# Patient Record
Sex: FEMALE | Race: WHITE | NOT HISPANIC OR LATINO | Employment: UNEMPLOYED | URBAN - NONMETROPOLITAN AREA
[De-identification: names, ages, dates, MRNs, and addresses within clinical notes are randomized per-mention and may not be internally consistent; named-entity substitution may affect disease eponyms.]

---

## 2017-01-10 ENCOUNTER — TELEPHONE (OUTPATIENT)
Dept: INTERNAL MEDICINE | Facility: CLINIC | Age: 27
End: 2017-01-10

## 2017-01-10 NOTE — TELEPHONE ENCOUNTER
----- Message from Lety Weir sent at 1/9/2017  3:17 PM EST -----  Contact: Patient   Patient called the office requesting to see if Dr. Pedro could send in a Rx for her restless leg syndrome. She said that the Ropentanol no longer helps her and is wanting to see if something else could get sent in to the [facility] pharmacy.

## 2017-02-06 ENCOUNTER — TELEPHONE (OUTPATIENT)
Dept: INTERNAL MEDICINE | Facility: CLINIC | Age: 27
End: 2017-02-06

## 2017-02-06 NOTE — TELEPHONE ENCOUNTER
Spoke with pt, she will bring letter by from Bariatric surgeon Wed am that just needs signature from Dr. Pedro.

## 2017-02-06 NOTE — TELEPHONE ENCOUNTER
----- Message from Carmelita Hidalgo sent at 2/6/2017 11:32 AM EST -----  Contact: PATIENT   PLEASE RETURN CALL. PATIENT HAS A PAPER THAT SHE JUST NEEDS DR. SALVADOR TO FILL OUT FOR HER WEIGHT LOSS. WOULD LIKE TO KNOW IF SHE CAN JUST DROP THIS OFF, OR FAX IT TO OUR OFFICE INSTEAD OF COMING IN FOR AN APPOINTMENT?

## 2017-02-17 ENCOUNTER — PATIENT MESSAGE (OUTPATIENT)
Dept: INTERNAL MEDICINE | Facility: CLINIC | Age: 27
End: 2017-02-17

## 2017-02-17 RX ORDER — OSELTAMIVIR PHOSPHATE 75 MG/1
75 CAPSULE ORAL DAILY
Qty: 10 CAPSULE | Refills: 0 | Status: SHIPPED | OUTPATIENT
Start: 2017-02-17 | End: 2017-02-27

## 2017-02-17 NOTE — TELEPHONE ENCOUNTER
From: Carolyn Schrader  To: Kristen Pedro MD  Sent: 2/17/2017 9:38 AM EST  Subject: Prescription Question    I have been around 2 kids with the flu type A in the last couple of days and was wondering if I could get a prescription for tamiflu as precaution. I have had a couple symptoms such as upset stomach headache and fatigue. With my brother in law having cancer and on chemo I would like to try to prevent getting the flu virus.     I use [email or web address] in [place].     Thank you.

## 2017-03-27 DIAGNOSIS — J45.30 MILD PERSISTENT ASTHMA WITHOUT COMPLICATION: ICD-10-CM

## 2017-03-27 RX ORDER — MONTELUKAST SODIUM 10 MG/1
TABLET ORAL
Qty: 30 TABLET | Refills: 5 | Status: SHIPPED | OUTPATIENT
Start: 2017-03-27 | End: 2017-08-02 | Stop reason: SDUPTHER

## 2017-03-28 RX ORDER — LANSOPRAZOLE 15 MG/1
15 CAPSULE, DELAYED RELEASE ORAL DAILY
Qty: 30 CAPSULE | Refills: 3 | Status: SHIPPED | OUTPATIENT
Start: 2017-03-28 | End: 2017-04-07

## 2017-03-28 RX ORDER — METOPROLOL SUCCINATE 50 MG/1
50 TABLET, EXTENDED RELEASE ORAL DAILY
Qty: 30 TABLET | Refills: 3 | Status: SHIPPED | OUTPATIENT
Start: 2017-03-28 | End: 2017-08-02

## 2017-04-07 RX ORDER — OMEPRAZOLE 20 MG/1
20 CAPSULE, DELAYED RELEASE ORAL DAILY
Qty: 30 CAPSULE | Refills: 5 | Status: SHIPPED | OUTPATIENT
Start: 2017-04-07 | End: 2017-08-02

## 2017-04-17 RX ORDER — LANSOPRAZOLE 15 MG/1
15 CAPSULE, DELAYED RELEASE ORAL DAILY
Qty: 30 CAPSULE | Refills: 3 | Status: SHIPPED | OUTPATIENT
Start: 2017-04-17 | End: 2017-08-02 | Stop reason: SDUPTHER

## 2017-06-14 DIAGNOSIS — F33.0 MILD EPISODE OF RECURRENT MAJOR DEPRESSIVE DISORDER (HCC): ICD-10-CM

## 2017-06-14 RX ORDER — CITALOPRAM 20 MG/1
20 TABLET ORAL DAILY
Qty: 30 TABLET | Refills: 0 | Status: SHIPPED | OUTPATIENT
Start: 2017-06-14 | End: 2017-07-26 | Stop reason: SDUPTHER

## 2017-07-26 DIAGNOSIS — F33.0 MILD EPISODE OF RECURRENT MAJOR DEPRESSIVE DISORDER (HCC): ICD-10-CM

## 2017-07-26 RX ORDER — CITALOPRAM 20 MG/1
TABLET ORAL
Qty: 14 TABLET | Refills: 0 | Status: SHIPPED | OUTPATIENT
Start: 2017-07-26 | End: 2017-09-01

## 2017-08-02 ENCOUNTER — OFFICE VISIT (OUTPATIENT)
Dept: INTERNAL MEDICINE | Facility: CLINIC | Age: 27
End: 2017-08-02

## 2017-08-02 VITALS
OXYGEN SATURATION: 96 % | RESPIRATION RATE: 16 BRPM | HEIGHT: 63 IN | TEMPERATURE: 98.7 F | BODY MASS INDEX: 45.2 KG/M2 | WEIGHT: 255.13 LBS | HEART RATE: 82 BPM | DIASTOLIC BLOOD PRESSURE: 92 MMHG | SYSTOLIC BLOOD PRESSURE: 130 MMHG

## 2017-08-02 DIAGNOSIS — I10 ESSENTIAL HYPERTENSION: ICD-10-CM

## 2017-08-02 DIAGNOSIS — E66.01 MORBID OBESITY, UNSPECIFIED OBESITY TYPE (HCC): ICD-10-CM

## 2017-08-02 DIAGNOSIS — F33.0 MILD EPISODE OF RECURRENT MAJOR DEPRESSIVE DISORDER (HCC): Primary | ICD-10-CM

## 2017-08-02 DIAGNOSIS — K21.9 GASTROESOPHAGEAL REFLUX DISEASE WITHOUT ESOPHAGITIS: ICD-10-CM

## 2017-08-02 DIAGNOSIS — J45.30 MILD PERSISTENT ASTHMA WITHOUT COMPLICATION: ICD-10-CM

## 2017-08-02 PROCEDURE — 99214 OFFICE O/P EST MOD 30 MIN: CPT | Performed by: NURSE PRACTITIONER

## 2017-08-02 RX ORDER — LANSOPRAZOLE 30 MG/1
30 CAPSULE, DELAYED RELEASE ORAL DAILY
Qty: 30 CAPSULE | Refills: 5 | Status: SHIPPED | OUTPATIENT
Start: 2017-08-02 | End: 2018-06-14 | Stop reason: SDUPTHER

## 2017-08-02 RX ORDER — LORATADINE 10 MG/1
10 TABLET ORAL DAILY
Qty: 30 TABLET | Refills: 5 | Status: SHIPPED | OUTPATIENT
Start: 2017-08-02 | End: 2018-06-14 | Stop reason: SDUPTHER

## 2017-08-02 RX ORDER — VENLAFAXINE 50 MG/1
50 TABLET ORAL DAILY
Qty: 30 TABLET | Refills: 0 | Status: SHIPPED | OUTPATIENT
Start: 2017-08-02 | End: 2017-09-01 | Stop reason: SINTOL

## 2017-08-02 RX ORDER — ALBUTEROL SULFATE 90 UG/1
2 AEROSOL, METERED RESPIRATORY (INHALATION) EVERY 6 HOURS PRN
Qty: 1 INHALER | Refills: 3 | OUTPATIENT
Start: 2017-08-02 | End: 2021-07-05

## 2017-08-02 RX ORDER — FLUTICASONE PROPIONATE 50 MCG
1 SPRAY, SUSPENSION (ML) NASAL DAILY PRN
Qty: 1 EACH | Refills: 2 | Status: SHIPPED | OUTPATIENT
Start: 2017-08-02 | End: 2021-07-28

## 2017-08-02 RX ORDER — MONTELUKAST SODIUM 10 MG/1
10 TABLET ORAL NIGHTLY
Qty: 30 TABLET | Refills: 5 | Status: SHIPPED | OUTPATIENT
Start: 2017-08-02 | End: 2018-06-14 | Stop reason: SDUPTHER

## 2017-08-02 NOTE — PATIENT INSTRUCTIONS
Venlafaxine tablets  What is this medicine?  VENLAFAXINE (CINTHIA la fax een) is used to treat depression, anxiety and panic disorder.  This medicine may be used for other purposes; ask your health care provider or pharmacist if you have questions.  COMMON BRAND NAME(S): Effexor  What should I tell my health care provider before I take this medicine?  They need to know if you have any of these conditions:  -bleeding disorders  -glaucoma  -heart disease  -high blood pressure  -high cholesterol  -kidney disease  -liver disease  -low levels of sodium in the blood  -mitch or bipolar disorder  -seizures  -suicidal thoughts, plans, or attempt; a previous suicide attempt by you or a family  -take medicines that treat or prevent blood clots  -thyroid disease  -an unusual or allergic reaction to venlafaxine, desvenlafaxine, other medicines, foods, dyes, or preservatives  -pregnant or trying to get pregnant  -breast-feeding  How should I use this medicine?  Take this medicine by mouth with a glass of water. Follow the directions on the prescription label. Take it with food. Take your medicine at regular intervals. Do not take your medicine more often than directed. Do not stop taking this medicine suddenly except upon the advice of your doctor. Stopping this medicine too quickly may cause serious side effects or your condition may worsen.  A special MedGuide will be given to you by the pharmacist with each prescription and refill. Be sure to read this information carefully each time.  Talk to your pediatrician regarding the use of this medicine in children. Special care may be needed.  Overdosage: If you think you have taken too much of this medicine contact a poison control center or emergency room at once.  NOTE: This medicine is only for you. Do not share this medicine with others.  What if I miss a dose?  If you miss a dose, take it as soon as you can. If it is almost time for your next dose, take only that dose. Do not take  double or extra doses.  What may interact with this medicine?  Do not take this medicine with any of the following medications:  -certain medicines for fungal infections like fluconazole, itraconazole, ketoconazole, posaconazole, voriconazole  -cisapride  -desvenlafaxine  -dofetilide  -dronedarone  -duloxetine  -levomilnacipran  -linezolid  -MAOIs like Carbex, Eldepryl, Marplan, Nardil, and Parnate  -methylene blue (injected into a vein)  -milnacipran  -pimozide  -thioridazine  -ziprasidone  This medicine may also interact with the following medications:  -amphetamines  -aspirin and aspirin-like medicines  -certain medicines for depression, anxiety, or psychotic disturbances  -certain medicines for migraine headaches like almotriptan, eletriptan, frovatriptan, naratriptan, rizatriptan, sumatriptan, zolmitriptan  -certain medicines for sleep  -certain medicines that treat or prevent blood clots like dalteparin, enoxaparin, warfarin  -cimetidine  -clozapine  -diuretics  -fentanyl  -furazolidone  -indinavir  -isoniazid  -lithium  -metoprolol  -NSAIDS, medicines for pain and inflammation, like ibuprofen or naproxen  -other medicines that prolong the QT interval (cause an abnormal heart rhythm)  -procarbazine  -rasagiline  -supplements like Sravan's wort, kava kava, valerian  -tramadol  -tryptophan  This list may not describe all possible interactions. Give your health care provider a list of all the medicines, herbs, non-prescription drugs, or dietary supplements you use. Also tell them if you smoke, drink alcohol, or use illegal drugs. Some items may interact with your medicine.  What should I watch for while using this medicine?  Tell your doctor if your symptoms do not get better or if they get worse. Visit your doctor or health care professional for regular checks on your progress. Because it may take several weeks to see the full effects of this medicine, it is important to continue your treatment as prescribed  by your doctor.  Patients and their families should watch out for new or worsening thoughts of suicide or depression. Also watch out for sudden changes in feelings such as feeling anxious, agitated, panicky, irritable, hostile, aggressive, impulsive, severely restless, overly excited and hyperactive, or not being able to sleep. If this happens, especially at the beginning of treatment or after a change in dose, call your health care professional.  This medicine can cause an increase in blood pressure. Check with your doctor for instructions on monitoring your blood pressure while taking this medicine.  You may get drowsy or dizzy. Do not drive, use machinery, or do anything that needs mental alertness until you know how this medicine affects you. Do not stand or sit up quickly, especially if you are an older patient. This reduces the risk of dizzy or fainting spells. Alcohol may interfere with the effect of this medicine. Avoid alcoholic drinks.  Your mouth may get dry. Chewing sugarless gum, sucking hard candy and drinking plenty of water will help. Contact your doctor if the problem does not go away or is severe.  What side effects may I notice from receiving this medicine?  Side effects that you should report to your doctor or health care professional as soon as possible:  -allergic reactions like skin rash, itching or hives, swelling of the face, lips, or tongue  -breathing problems  -changes in vision  -seizures  -suicidal thoughts or other mood changes  -trouble passing urine or change in the amount of urine  -unusual bleeding or bruising  Side effects that usually do not require medical attention (report to your doctor or health care professional if they continue or are bothersome):  -change in sex drive or performance  -constipation  -increased sweating  -loss of appetite  -nausea  -tremors  -weight loss  This list may not describe all possible side effects. Call your doctor for medical advice about side  effects. You may report side effects to FDA at 2-154-GWD-6764.  Where should I keep my medicine?  Keep out of the reach of children.  Store at a controlled temperature between 20 and 25 degrees C (68 and 77 degrees F), in a dry place. Throw away any unused medicine after the expiration date.  NOTE: This sheet is a summary. It may not cover all possible information. If you have questions about this medicine, talk to your doctor, pharmacist, or health care provider.     © 2017, [employer]. (2016-10-25 21:22:16)

## 2017-08-02 NOTE — PROGRESS NOTES
Chief Complaint / Reason:      Chief Complaint   Patient presents with   • Depression     f/u, asked to change Celexa to something else and increase the Prevacid to 30 mg.        Subjective     HPI  Patient presents today with complaints of depression. She would like to discussed medications for depression and GERD. She states that she is very irritable and is not feeling like herself. States she has battled with depression for a long time and thinks its gotten worse she has tried different medications and is currently on Celexa but does not feel it is that effective. Denies intent to harm herself or others. She had gastric sleeve in march and is trying to improve overall health. She states that she doesn't feel like she is under more stress than normal but could be as her reflux is worse, indicates the medication needs increased.   History taken from: patient    PMH/FH/Social History were reviewed and updated appropriately in the electronic medical record.     Review of Systems:   Review of Systems   Constitutional: Positive for fatigue.   Respiratory: Negative.    Cardiovascular: Positive for leg swelling. Negative for chest pain and palpitations.   Gastrointestinal: Negative.    Musculoskeletal: Positive for arthralgias.   Psychiatric/Behavioral: Negative for self-injury and suicidal ideas.        Depressed mood and irritable     All other systems were reviewed and are negative.  Exceptions are noted in the subjective or above.      Objective     Vital Signs  Vitals:    08/02/17 0943   BP: 130/92   Pulse: 82   Resp: 16   Temp: 98.7 °F (37.1 °C)   SpO2: 96%       Body mass index is 45.19 kg/(m^2).    Physical Exam   Constitutional: She is oriented to person, place, and time. She appears well-developed and well-nourished.   Morbidly obese   HENT:   Head: Normocephalic.   Eyes: Pupils are equal, round, and reactive to light.   Cardiovascular: Normal rate, regular rhythm, normal heart sounds and intact distal  pulses.    Pulmonary/Chest: Effort normal and breath sounds normal.   Abdominal: Soft. Bowel sounds are normal.   Musculoskeletal: She exhibits edema.   Neurological: She is alert and oriented to person, place, and time.   Skin: Skin is warm and dry. No rash noted. No erythema.   Psychiatric: She has a normal mood and affect. Her behavior is normal. Judgment and thought content normal.   Nursing note and vitals reviewed.    Medication Review:     Current Outpatient Prescriptions:   •  albuterol (PROVENTIL) (2.5 MG/3ML) 0.083% nebulizer solution, Take 2.5 mg by nebulization Every 6 (Six) Hours As Needed for wheezing. USE 1 UNIT DOSE EVERY 4-6 HOURS AS NEEDED FOR WHEEZING, Disp: 150 vial, Rfl: 3  •  citalopram (CeleXA) 20 MG tablet, TAKE ONE TABLET BY MOUTH ONCE DAILY NEED  APPOINTMENT  FOR  ADDITIONAL  REFILLS, Disp: 14 tablet, Rfl: 0  •  albuterol (PROVENTIL HFA;VENTOLIN HFA) 108 (90 BASE) MCG/ACT inhaler, Inhale 2 puffs Every 6 (Six) Hours As Needed for Wheezing., Disp: 1 inhaler, Rfl: 3  •  fluticasone (FLONASE) 50 MCG/ACT nasal spray, 1 spray into each nostril Daily As Needed for Allergies., Disp: 1 each, Rfl: 2  •  lansoprazole (PREVACID) 30 MG capsule, Take 1 capsule by mouth Daily., Disp: 30 capsule, Rfl: 5  •  loratadine (CLARITIN) 10 MG tablet, Take 1 tablet by mouth Daily., Disp: 30 tablet, Rfl: 5  •  montelukast (SINGULAIR) 10 MG tablet, Take 1 tablet by mouth Every Night., Disp: 30 tablet, Rfl: 5  •  venlafaxine (EFFEXOR) 50 MG tablet, Take 1 tablet by mouth Daily., Disp: 30 tablet, Rfl: 0    Assessment/Plan   Carolyn was seen today for depression.    Diagnoses and all orders for this visit:    Mild episode of recurrent major depressive disorder  -     venlafaxine (EFFEXOR) 50 MG tablet; Take 1 tablet by mouth Daily.  Recommend stress management  Essential hypertension  Initiate lifestyle modifications.   DASH Diet and exercise. Manage edema.Discussed BMI   Discussed ways to prevent of long-term  complications from high blood pressure.  Discussed when to seek medical attention.  Advised patient to take BP at home and if remains elevated to contact office.   Encouraged patient to take blood pressure daily and keep a log.  Gastroesophageal reflux disease without esophagitis  Discussed weight loss   Elevate HOB  Avoid eating heavy meals late at night.  Discussed dietary modifications  Morbid obesity, unspecified obesity type  Discussed weight loss, exercise, and dietary modifications    Follow up in 4 weeks and PRN  Tanika Nicole, JEAN  08/02/2017

## 2017-09-01 ENCOUNTER — OFFICE VISIT (OUTPATIENT)
Dept: INTERNAL MEDICINE | Facility: CLINIC | Age: 27
End: 2017-09-01

## 2017-09-01 VITALS
SYSTOLIC BLOOD PRESSURE: 124 MMHG | RESPIRATION RATE: 16 BRPM | WEIGHT: 250.13 LBS | OXYGEN SATURATION: 98 % | HEIGHT: 63 IN | BODY MASS INDEX: 44.32 KG/M2 | DIASTOLIC BLOOD PRESSURE: 72 MMHG | HEART RATE: 92 BPM | TEMPERATURE: 98.3 F

## 2017-09-01 DIAGNOSIS — F33.0 MILD EPISODE OF RECURRENT MAJOR DEPRESSIVE DISORDER (HCC): Primary | ICD-10-CM

## 2017-09-01 PROCEDURE — 99213 OFFICE O/P EST LOW 20 MIN: CPT | Performed by: NURSE PRACTITIONER

## 2017-09-01 RX ORDER — FLUOXETINE 10 MG/1
20 TABLET, FILM COATED ORAL DAILY
Qty: 60 TABLET | Refills: 0 | Status: SHIPPED | OUTPATIENT
Start: 2017-09-01 | End: 2017-11-30

## 2017-09-01 NOTE — PROGRESS NOTES
Chief Complaint / Reason:      Chief Complaint   Patient presents with   • Depression     f/u-1 mo., not tolerating the venlafexine very well. Left ear pain.        Subjective     HPI  Patient presents today for 1 month follow up regarding depression. She states that she is not tolerating venlafaxine very well as it makes her more irritable and depressed. States she would like to try something different. She also indicates that her medication not working makes her want to eat more and she is already morbidly obese and had Gastric sleeve March 13 2017. Denies any SI or HI. No chest pain, shortness of breath, or heart palpitations. She states that she is not able to sleep very well and wakes up several times per night.   History taken from: patient    PMH/FH/Social History were reviewed and updated appropriately in the electronic medical record.     Review of Systems:   Review of Systems   Constitutional: Positive for fatigue. Negative for unexpected weight change.   Respiratory: Negative.    Cardiovascular: Negative.  Negative for chest pain and palpitations.   Gastrointestinal: Negative.    Psychiatric/Behavioral: Positive for dysphoric mood and sleep disturbance. Negative for self-injury and suicidal ideas.        Depressed mood and irritable     All other systems were reviewed and are negative.  Exceptions are noted in the subjective or above.      Objective     Vital Signs  Vitals:    09/01/17 1743   BP: 124/72   Pulse: 92   Resp: 16   Temp: 98.3 °F (36.8 °C)   SpO2: 98%       Body mass index is 44.31 kg/(m^2).    Physical Exam   Constitutional: She is oriented to person, place, and time. She appears well-developed and well-nourished.   Morbidly obese   HENT:   Head: Normocephalic.   Eyes: Pupils are equal, round, and reactive to light.   Cardiovascular: Normal rate, regular rhythm, normal heart sounds and intact distal pulses.    Pulmonary/Chest: Effort normal and breath sounds normal.   Abdominal: Soft. Bowel  sounds are normal.   Neurological: She is alert and oriented to person, place, and time.   Skin: Skin is warm and dry. No rash noted. No erythema.   Psychiatric: Her behavior is normal. Judgment and thought content normal. She exhibits a depressed mood.   Nursing note and vitals reviewed.          Medication Review:     Current Outpatient Prescriptions:   •  albuterol (PROVENTIL HFA;VENTOLIN HFA) 108 (90 BASE) MCG/ACT inhaler, Inhale 2 puffs Every 6 (Six) Hours As Needed for Wheezing., Disp: 1 inhaler, Rfl: 3  •  albuterol (PROVENTIL) (2.5 MG/3ML) 0.083% nebulizer solution, Take 2.5 mg by nebulization Every 6 (Six) Hours As Needed for wheezing. USE 1 UNIT DOSE EVERY 4-6 HOURS AS NEEDED FOR WHEEZING, Disp: 150 vial, Rfl: 3  •  fluticasone (FLONASE) 50 MCG/ACT nasal spray, 1 spray into each nostril Daily As Needed for Allergies., Disp: 1 each, Rfl: 2  •  lansoprazole (PREVACID) 30 MG capsule, Take 1 capsule by mouth Daily., Disp: 30 capsule, Rfl: 5  •  loratadine (CLARITIN) 10 MG tablet, Take 1 tablet by mouth Daily., Disp: 30 tablet, Rfl: 5  •  montelukast (SINGULAIR) 10 MG tablet, Take 1 tablet by mouth Every Night., Disp: 30 tablet, Rfl: 5  •  FLUoxetine (PROzac) 10 MG tablet, Take 2 tablets by mouth Daily for 90 days., Disp: 60 tablet, Rfl: 0    Assessment/Plan   Carolyn was seen today for depression.    Diagnoses and all orders for this visit:    Mild episode of recurrent major depressive disorder  -     FLUoxetine (PROzac) 10 MG tablet; Take 2 tablets by mouth Daily for 90 days.  Recommend patient take medication as prescribed.   Discussed indications, dosage, and S/E.  Discussed worsening signs and symptoms.     Follow up in 4 weeks and PRN.   Tanika Nicole, APRN  09/01/2017

## 2017-10-17 ENCOUNTER — TELEPHONE (OUTPATIENT)
Dept: INTERNAL MEDICINE | Facility: CLINIC | Age: 27
End: 2017-10-17

## 2017-10-17 NOTE — TELEPHONE ENCOUNTER
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 SHE WOULD LIKE TO GET DROPS TO PREVENT HERSELF FROM GETTING IT. PATIENT STATES HER EYES ARE ITCHING BUT IS NOT RED NOR HAS ANY DRAINAGE.     PATIENT WOULD LIKE A RETURN CALL.

## 2017-10-18 NOTE — TELEPHONE ENCOUNTER
Spoke with pt. She woke up with red eyes, very itchy, and some drainage in both eyes. Will discuss with Dr. Pedro.

## 2017-11-09 ENCOUNTER — TELEPHONE (OUTPATIENT)
Dept: INTERNAL MEDICINE | Facility: CLINIC | Age: 27
End: 2017-11-09

## 2017-11-09 RX ORDER — FLUCONAZOLE 150 MG/1
150 TABLET ORAL ONCE
Qty: 1 TABLET | Refills: 1 | Status: SHIPPED | OUTPATIENT
Start: 2017-11-09 | End: 2017-11-09

## 2017-11-09 NOTE — TELEPHONE ENCOUNTER
PT WAS SEEN [facility] SINUS/EAR INF. NOW SHE HAS A REALLY BAD YEAST INF. SHE ASKED FOR 2 DIFLUCAN TABLETS.

## 2017-11-28 DIAGNOSIS — J45.30 MILD PERSISTENT ASTHMA WITHOUT COMPLICATION: ICD-10-CM

## 2017-11-28 RX ORDER — MONTELUKAST SODIUM 10 MG/1
TABLET ORAL
Qty: 30 TABLET | Refills: 5 | Status: SHIPPED | OUTPATIENT
Start: 2017-11-28 | End: 2018-07-23 | Stop reason: SDUPTHER

## 2018-02-20 RX ORDER — FLUOXETINE 10 MG/1
TABLET, FILM COATED ORAL
Qty: 60 TABLET | Refills: 0 | Status: SHIPPED | OUTPATIENT
Start: 2018-02-20 | End: 2018-06-14

## 2018-06-14 ENCOUNTER — OFFICE VISIT (OUTPATIENT)
Dept: INTERNAL MEDICINE | Facility: CLINIC | Age: 28
End: 2018-06-14

## 2018-06-14 ENCOUNTER — APPOINTMENT (OUTPATIENT)
Dept: LAB | Facility: HOSPITAL | Age: 28
End: 2018-06-14

## 2018-06-14 VITALS
DIASTOLIC BLOOD PRESSURE: 84 MMHG | HEART RATE: 94 BPM | HEIGHT: 62 IN | BODY MASS INDEX: 46.01 KG/M2 | SYSTOLIC BLOOD PRESSURE: 124 MMHG | OXYGEN SATURATION: 99 % | RESPIRATION RATE: 16 BRPM | WEIGHT: 250 LBS | TEMPERATURE: 98.5 F

## 2018-06-14 DIAGNOSIS — R59.9 ADENOPATHY: Primary | ICD-10-CM

## 2018-06-14 DIAGNOSIS — J30.2 ACUTE SEASONAL ALLERGIC RHINITIS, UNSPECIFIED TRIGGER: ICD-10-CM

## 2018-06-14 LAB
BASOPHILS # BLD AUTO: 0.08 10*3/MM3 (ref 0–0.2)
BASOPHILS NFR BLD AUTO: 0.6 % (ref 0–2.5)
DEPRECATED RDW RBC AUTO: 41.4 FL (ref 37–54)
EOSINOPHIL # BLD AUTO: 0 10*3/MM3 (ref 0–0.7)
EOSINOPHIL NFR BLD AUTO: 0 % (ref 0–7)
ERYTHROCYTE [DISTWIDTH] IN BLOOD BY AUTOMATED COUNT: 13.5 % (ref 11.5–14.5)
HCT VFR BLD AUTO: 41.8 % (ref 37–47)
HGB BLD-MCNC: 14.1 G/DL (ref 12–16)
IMM GRANULOCYTES # BLD: 0.07 10*3/MM3 (ref 0–0.06)
IMM GRANULOCYTES NFR BLD: 0.5 % (ref 0–0.6)
LYMPHOCYTES # BLD AUTO: 4.14 10*3/MM3 (ref 0.6–3.4)
LYMPHOCYTES NFR BLD AUTO: 28.5 % (ref 10–50)
MCH RBC QN AUTO: 28.5 PG (ref 27–31)
MCHC RBC AUTO-ENTMCNC: 33.7 G/DL (ref 30–37)
MCV RBC AUTO: 84.4 FL (ref 81–99)
MONOCYTES # BLD AUTO: 0.77 10*3/MM3 (ref 0–0.9)
MONOCYTES NFR BLD AUTO: 5.3 % (ref 0–12)
NEUTROPHILS # BLD AUTO: 9.48 10*3/MM3 (ref 2–6.9)
NEUTROPHILS NFR BLD AUTO: 65.1 % (ref 37–80)
NRBC BLD MANUAL-RTO: 0 /100 WBC (ref 0–0)
PLATELET # BLD AUTO: 293 10*3/MM3 (ref 130–400)
PMV BLD AUTO: 9.9 FL (ref 6–12)
RBC # BLD AUTO: 4.95 10*6/MM3 (ref 4.2–5.4)
WBC NRBC COR # BLD: 14.54 10*3/MM3 (ref 4.8–10.8)

## 2018-06-14 PROCEDURE — 99213 OFFICE O/P EST LOW 20 MIN: CPT | Performed by: NURSE PRACTITIONER

## 2018-06-14 PROCEDURE — 86663 EPSTEIN-BARR ANTIBODY: CPT | Performed by: NURSE PRACTITIONER

## 2018-06-14 PROCEDURE — 86664 EPSTEIN-BARR NUCLEAR ANTIGEN: CPT | Performed by: NURSE PRACTITIONER

## 2018-06-14 PROCEDURE — 85025 COMPLETE CBC W/AUTO DIFF WBC: CPT | Performed by: NURSE PRACTITIONER

## 2018-06-14 PROCEDURE — 36415 COLL VENOUS BLD VENIPUNCTURE: CPT | Performed by: NURSE PRACTITIONER

## 2018-06-14 PROCEDURE — 86665 EPSTEIN-BARR CAPSID VCA: CPT | Performed by: NURSE PRACTITIONER

## 2018-06-14 RX ORDER — AZITHROMYCIN 250 MG/1
TABLET, FILM COATED ORAL
Qty: 6 TABLET | Refills: 0 | OUTPATIENT
Start: 2018-06-14 | End: 2021-07-05

## 2018-06-14 RX ORDER — LORATADINE 10 MG/1
10 TABLET ORAL DAILY
Qty: 30 TABLET | Refills: 5 | Status: SHIPPED | OUTPATIENT
Start: 2018-06-14 | End: 2019-01-03 | Stop reason: SDUPTHER

## 2018-06-14 RX ORDER — LANSOPRAZOLE 30 MG/1
30 CAPSULE, DELAYED RELEASE ORAL DAILY
Qty: 30 CAPSULE | Refills: 5 | Status: SHIPPED | OUTPATIENT
Start: 2018-06-14 | End: 2019-01-03 | Stop reason: SDUPTHER

## 2018-06-16 LAB
EBV EA IGG SER-ACNC: <9 U/ML (ref 0–8.9)
EBV NA IGG SER IA-ACNC: 66.8 U/ML (ref 0–17.9)
EBV VCA IGG SER-ACNC: 220 U/ML (ref 0–17.9)
EBV VCA IGM SER-ACNC: <36 U/ML (ref 0–35.9)
INTERPRETATION: ABNORMAL

## 2018-06-18 ENCOUNTER — TELEPHONE (OUTPATIENT)
Dept: INTERNAL MEDICINE | Facility: CLINIC | Age: 28
End: 2018-06-18

## 2018-06-18 NOTE — TELEPHONE ENCOUNTER
Patient called and would like to discuss the results of her recent labs with someone as soon as possible.

## 2018-06-18 NOTE — TELEPHONE ENCOUNTER
I contacted patient and discussed lab results with her I recommend that she follow-up in a month and recheck CBC.  Also it appears that patient probably has chronic fatigue syndrome and she has had multiple respiratory infections and the flu several times.  sHe was previously started on antibiotics.

## 2018-06-20 NOTE — PROGRESS NOTES
Chief Complaint / Reason:      Chief Complaint   Patient presents with   • Swollen Glands     2 to the left side of the neck. Tender.        Subjective     HPI  Patient presents today with complaints of swollen glands on the left side of her neck and states that they're very tender.  She states that she is very tired denies chest pain, shortness of breath or heart palpitations denies fever or chills vital signs are stable.  She does report some seasonal allergies but denies productive cough trouble swallowing or ear pain.  She has tried taking over-the-counter medications which has provided minimal relief she noticed some swollen lymph nodes within the last week or so.  History taken from: patient    PMH/FH/Social History were reviewed and updated appropriately in the electronic medical record.     Review of Systems:   Review of Systems   Constitutional: Positive for fatigue.   HENT: Positive for postnasal drip and sinus pressure.    Respiratory: Negative.    Cardiovascular: Negative.    Gastrointestinal: Negative.    Neurological: Negative.    Hematological: Positive for adenopathy.     All other systems were reviewed and are negative.  Exceptions are noted in the subjective or above.      Objective     Vital Signs  Vitals:    06/14/18 1739   BP: 124/84   Pulse: 94   Resp: 16   Temp: 98.5 °F (36.9 °C)   SpO2: 99%       Body mass index is 45.73 kg/m².    Physical Exam   Constitutional: She is oriented to person, place, and time. She appears well-developed and well-nourished. No distress.   HENT:   Head: Normocephalic.   Right Ear: External ear normal. Tympanic membrane is bulging.   Left Ear: External ear normal. Tympanic membrane is bulging.   Nose: Right sinus exhibits maxillary sinus tenderness and frontal sinus tenderness. Left sinus exhibits maxillary sinus tenderness and frontal sinus tenderness.   Mouth/Throat: Mucous membranes are dry. Posterior oropharyngeal erythema present.   Cardiovascular: Normal  rate, regular rhythm, normal heart sounds and intact distal pulses.    Pulmonary/Chest: Effort normal and breath sounds normal. She has no wheezes. She exhibits no tenderness.   Abdominal: Soft. Bowel sounds are normal.   Lymphadenopathy:     She has cervical adenopathy.   Neurological: She is alert and oriented to person, place, and time.   Skin: Skin is warm and dry. No rash noted. No erythema. No pallor.   Psychiatric: She has a normal mood and affect. Her behavior is normal. Judgment and thought content normal.   Nursing note and vitals reviewed.       Results Review:    I reviewed the patient's previous clinical results.       Medication Review:     Current Outpatient Prescriptions:   •  albuterol (PROVENTIL HFA;VENTOLIN HFA) 108 (90 BASE) MCG/ACT inhaler, Inhale 2 puffs Every 6 (Six) Hours As Needed for Wheezing., Disp: 1 inhaler, Rfl: 3  •  albuterol (PROVENTIL) (2.5 MG/3ML) 0.083% nebulizer solution, Take 2.5 mg by nebulization Every 6 (Six) Hours As Needed for wheezing. USE 1 UNIT DOSE EVERY 4-6 HOURS AS NEEDED FOR WHEEZING, Disp: 150 vial, Rfl: 3  •  fluticasone (FLONASE) 50 MCG/ACT nasal spray, 1 spray into each nostril Daily As Needed for Allergies., Disp: 1 each, Rfl: 2  •  lansoprazole (PREVACID) 30 MG capsule, Take 1 capsule by mouth Daily., Disp: 30 capsule, Rfl: 5  •  loratadine (CLARITIN) 10 MG tablet, Take 1 tablet by mouth Daily., Disp: 30 tablet, Rfl: 5  •  montelukast (SINGULAIR) 10 MG tablet, TAKE ONE TABLET BY MOUTH AT BEDTIME, Disp: 30 tablet, Rfl: 5  •  azithromycin (ZITHROMAX) 250 MG tablet, Take 2 tablets the first day, then 1 tablet daily for 4 days., Disp: 6 tablet, Rfl: 0    Assessment/Plan   Carolyn was seen today for swollen glands.    Diagnoses and all orders for this visit:    Adenopathy  -     [place]-Barr Virus VCA Antibody Panel  -     CBC w AUTO Differential  -     azithromycin (ZITHROMAX) 250 MG tablet; Take 2 tablets the first day, then 1 tablet daily for 4 days.  -     CBC  Auto Differential    Acute seasonal allergic rhinitis, unspecified trigger  -     loratadine (CLARITIN) 10 MG tablet; Take 1 tablet by mouth Daily.    Other orders  -     lansoprazole (PREVACID) 30 MG capsule; Take 1 capsule by mouth Daily.        Return if symptoms worsen or fail to improve.    Tanika Nicole, APRN  06/14/2018

## 2018-06-22 RX ORDER — FLUCONAZOLE 150 MG/1
150 TABLET ORAL ONCE
Qty: 1 TABLET | Refills: 0 | Status: SHIPPED | OUTPATIENT
Start: 2018-06-22 | End: 2018-06-22

## 2018-07-10 DIAGNOSIS — D72.829 LEUKOCYTOSIS, UNSPECIFIED TYPE: Primary | ICD-10-CM

## 2018-07-23 DIAGNOSIS — J45.30 MILD PERSISTENT ASTHMA WITHOUT COMPLICATION: ICD-10-CM

## 2018-07-23 RX ORDER — MONTELUKAST SODIUM 10 MG/1
10 TABLET ORAL
Qty: 30 TABLET | Refills: 5 | OUTPATIENT
Start: 2018-07-23 | End: 2021-07-05

## 2018-09-23 ENCOUNTER — HOSPITAL ENCOUNTER (EMERGENCY)
Facility: HOSPITAL | Age: 28
Discharge: HOME | End: 2018-09-23
Attending: EMERGENCY MEDICINE | Admitting: EMERGENCY MEDICINE

## 2018-09-23 VITALS
BODY MASS INDEX: 47.29 KG/M2 | WEIGHT: 257 LBS | RESPIRATION RATE: 18 BRPM | HEIGHT: 62 IN | HEART RATE: 81 BPM | SYSTOLIC BLOOD PRESSURE: 161 MMHG | DIASTOLIC BLOOD PRESSURE: 82 MMHG | OXYGEN SATURATION: 100 % | TEMPERATURE: 97.7 F

## 2018-09-23 DIAGNOSIS — J06.9 UPPER RESPIRATORY TRACT INFECTION, UNSPECIFIED TYPE: Primary | ICD-10-CM

## 2018-09-23 PROCEDURE — 99283 EMERGENCY DEPT VISIT LOW MDM: CPT

## 2018-09-23 RX ORDER — AZITHROMYCIN 250 MG/1
TABLET, FILM COATED ORAL
Qty: 6 TABLET | Refills: 0 | OUTPATIENT
Start: 2018-09-23 | End: 2021-07-05

## 2018-09-23 NOTE — ED PROVIDER NOTES
TRIAGE CHIEF COMPLAINT:     Nursing and triage notes reviewed    Chief Complaint   Patient presents with   • Cough      HPI: Carolyn Theodore is a 28 y.o. female who presents to the emergency department complaining of a 2 day history of sinus pressure, cough, congestion.  Patient states symptoms have moved from her sinuses into her chest over the past 24 hours.  She states she's been coughing up a yellow colored mucus over the past day.  She denies having a sore throat.  She states she's had some chills but denies fever.  She denies any generalized body aches.  No abdominal pain, nausea, vomiting, diarrhea.  No sick contacts.  No recent travel.  Patient currently 8 weeks pregnant.     REVIEW OF SYSTEMS: All other systems reviewed and are negative     PAST MEDICAL HISTORY:   Past Medical History:   Diagnosis Date   • Abnormal liver enzymes    • Asthma     Positive methacholine challenge test.  Frequent bronchitis.   • Depression    • Eosinophilia     Resolved in the past once off of Singulair.   • GERD (gastroesophageal reflux disease)     w/o esophagitis   • Hypertension    • Morbid obesity (CMS/HCC)    • Obesity      Negative sleep study in the past, no nocturnal hypoxemia on the study   • Renal calculi    • Sleep apnea         FAMILY HISTORY:   Family History   Problem Relation Age of Onset   • Hypertension Father    • Diabetes Father    • Alcohol abuse Other    • Cancer Other    • Hypertension Other    • Hypertension Other         SOCIAL HISTORY:   Social History     Social History   • Marital status: Single     Spouse name: N/A   • Number of children: N/A   • Years of education: N/A     Occupational History   • Not on file.     Social History Main Topics   • Smoking status: Never Smoker   • Smokeless tobacco: Never Used   • Alcohol use No   • Drug use: No   • Sexual activity: Yes     Partners: Male     Birth control/ protection: None     Other Topics Concern   • Not on file     Social History Narrative   • No  narrative on file        SURGICAL HISTORY:   Past Surgical History:   Procedure Laterality Date   • CHOLECYSTECTOMY     • GASTRIC SLEEVE LAPAROSCOPIC     • KIDNEY STONE SURGERY          CURRENT MEDICATIONS:      Medication List      ASK your doctor about these medications    * albuterol (2.5 MG/3ML) 0.083% nebulizer solution  Commonly known as:  PROVENTIL  Take 2.5 mg by nebulization Every 6 (Six) Hours As Needed for wheezing.   USE 1 UNIT DOSE EVERY 4-6 HOURS AS NEEDED FOR WHEEZING     * albuterol 108 (90 Base) MCG/ACT inhaler  Commonly known as:  PROVENTIL HFA;VENTOLIN HFA  Inhale 2 puffs Every 6 (Six) Hours As Needed for Wheezing.     azithromycin 250 MG tablet  Commonly known as:  ZITHROMAX  Take 2 tablets the first day, then 1 tablet daily for 4 days.     fluticasone 50 MCG/ACT nasal spray  Commonly known as:  FLONASE  1 spray into each nostril Daily As Needed for Allergies.     lansoprazole 30 MG capsule  Commonly known as:  PREVACID  Take 1 capsule by mouth Daily.     loratadine 10 MG tablet  Commonly known as:  CLARITIN  Take 1 tablet by mouth Daily.     montelukast 10 MG tablet  Commonly known as:  SINGULAIR  Take 1 tablet by mouth every night at bedtime.        * This list has 2 medication(s) that are the same as other medications   prescribed for you. Read the directions carefully, and ask your doctor or   other care provider to review them with you.               ALLERGIES: Doxycycline; Nsaids; Amoxicillin; and Penicillins     PHYSICAL EXAM:   VITAL SIGNS:   Vitals:    09/23/18 0829   BP: 161/82   Pulse: 81   Resp: 18   Temp: 97.7 °F (36.5 °C)   SpO2: 100%      CONSTITUTIONAL: Awake, oriented, appears non-toxic   HENT: Atraumatic, normocephalic, oral mucosa pink and moist, airway patent. Nares patent without drainage. External ears normal.  Posterior pharynx is normal in appearance.  Tympanic membranes normal in appearance.  No tenderness over the sinuses.  EYES: Conjunctiva clear   NECK: Trachea  midline, non-tender, supple   CARDIOVASCULAR: Normal heart rate, Normal rhythm, No murmurs, rubs, gallops   PULMONARY/CHEST: Clear to auscultation, no rhonchi, wheezes, or rales. Symmetrical breath sounds.  ABDOMINAL: Non-distended, soft, non-tender - no rebound or guarding.  NEUROLOGIC: Non-focal, moving all four extremities, no gross sensory or motor deficits.   EXTREMITIES: No clubbing, cyanosis, or edema   SKIN: Warm, Dry, No erythema, No rash     ED COURSE / MEDICAL DECISION MAKING:   Carolyn Theodore is a 28 y.o. female who presents to the emergency department for evaluation of upper respiratory infection like symptoms.  Patient is nondistressed on arrival was stable vital signs.  Patient has no obvious drainage or sinus pressure currently.  She does cough on occasion but has no abnormal lung sounds.  Based on patient's symptoms I suspect this is likely viral in nature.  After discussion with the patient we decided to withhold any imaging given her pregnancy status.  We'll try some symptomatic therapies.  At request I did write a prescription for some antibiotics but asked patient to refrain from using them from several days and only use them if she felt that she was worsening.  Return precautions were discussed.     DECISION TO DISCHARGE/ADMIT: see ED care timeline     FINAL IMPRESSION:   1 -- upper respiratory infection   2 --   3 --     Electronically signed by: Addis Jaramillo MD, 9/23/2018 8:49 AM       Addis Jaramillo MD  09/23/18 0854

## 2019-01-03 DIAGNOSIS — J30.2 ACUTE SEASONAL ALLERGIC RHINITIS: ICD-10-CM

## 2019-01-04 RX ORDER — LANSOPRAZOLE 30 MG/1
CAPSULE, DELAYED RELEASE ORAL
Qty: 30 CAPSULE | Refills: 5 | Status: SHIPPED | OUTPATIENT
Start: 2019-01-04 | End: 2019-08-09 | Stop reason: SDUPTHER

## 2019-01-04 RX ORDER — BENZOYL PEROXIDE
KIT TOPICAL
Qty: 30 TABLET | Refills: 5 | Status: SHIPPED | OUTPATIENT
Start: 2019-01-04 | End: 2019-08-09 | Stop reason: SDUPTHER

## 2019-08-09 DIAGNOSIS — J30.2 ACUTE SEASONAL ALLERGIC RHINITIS: ICD-10-CM

## 2019-08-09 RX ORDER — LORATADINE 10 MG/1
TABLET ORAL
Qty: 30 TABLET | Refills: 0 | Status: SHIPPED | OUTPATIENT
Start: 2019-08-09 | End: 2019-09-14 | Stop reason: SDUPTHER

## 2019-08-09 RX ORDER — LANSOPRAZOLE 30 MG/1
CAPSULE, DELAYED RELEASE ORAL
Qty: 30 CAPSULE | Refills: 0 | Status: SHIPPED | OUTPATIENT
Start: 2019-08-09 | End: 2019-09-14 | Stop reason: SDUPTHER

## 2019-09-14 DIAGNOSIS — J30.2 ACUTE SEASONAL ALLERGIC RHINITIS: ICD-10-CM

## 2019-09-14 RX ORDER — LORATADINE 10 MG/1
TABLET ORAL
Qty: 30 TABLET | Refills: 0 | Status: SHIPPED | OUTPATIENT
Start: 2019-09-14 | End: 2019-10-16 | Stop reason: SDUPTHER

## 2019-09-14 RX ORDER — LANSOPRAZOLE 30 MG/1
CAPSULE, DELAYED RELEASE ORAL
Qty: 30 CAPSULE | Refills: 0 | Status: SHIPPED | OUTPATIENT
Start: 2019-09-14 | End: 2019-10-16 | Stop reason: SDUPTHER

## 2019-10-16 DIAGNOSIS — J30.2 ACUTE SEASONAL ALLERGIC RHINITIS: ICD-10-CM

## 2019-10-16 RX ORDER — LORATADINE 10 MG/1
TABLET ORAL
Qty: 30 TABLET | Refills: 0 | Status: SHIPPED | OUTPATIENT
Start: 2019-10-16 | End: 2019-11-15 | Stop reason: SDUPTHER

## 2019-10-16 RX ORDER — LANSOPRAZOLE 30 MG/1
CAPSULE, DELAYED RELEASE ORAL
Qty: 30 CAPSULE | Refills: 0 | Status: SHIPPED | OUTPATIENT
Start: 2019-10-16 | End: 2019-11-15 | Stop reason: SDUPTHER

## 2019-11-15 DIAGNOSIS — J30.2 ACUTE SEASONAL ALLERGIC RHINITIS: ICD-10-CM

## 2019-11-15 RX ORDER — LANSOPRAZOLE 30 MG/1
CAPSULE, DELAYED RELEASE ORAL
Qty: 30 CAPSULE | Refills: 0 | Status: SHIPPED | OUTPATIENT
Start: 2019-11-15 | End: 2021-07-28 | Stop reason: SDUPTHER

## 2019-11-15 RX ORDER — LORATADINE 10 MG/1
TABLET ORAL
Qty: 30 TABLET | Refills: 0 | Status: SHIPPED | OUTPATIENT
Start: 2019-11-15 | End: 2022-07-15 | Stop reason: SDUPTHER

## 2019-12-16 DIAGNOSIS — J30.2 ACUTE SEASONAL ALLERGIC RHINITIS: ICD-10-CM

## 2019-12-16 RX ORDER — LORATADINE 10 MG/1
TABLET ORAL
Qty: 30 TABLET | Refills: 0 | OUTPATIENT
Start: 2019-12-16

## 2019-12-16 RX ORDER — LANSOPRAZOLE 30 MG/1
CAPSULE, DELAYED RELEASE ORAL
Qty: 30 CAPSULE | Refills: 0 | OUTPATIENT
Start: 2019-12-16

## 2021-07-28 ENCOUNTER — OFFICE VISIT (OUTPATIENT)
Dept: FAMILY MEDICINE CLINIC | Facility: CLINIC | Age: 31
End: 2021-07-28

## 2021-07-28 VITALS
HEIGHT: 63 IN | TEMPERATURE: 98.2 F | OXYGEN SATURATION: 98 % | RESPIRATION RATE: 20 BRPM | WEIGHT: 291.8 LBS | HEART RATE: 83 BPM | DIASTOLIC BLOOD PRESSURE: 86 MMHG | SYSTOLIC BLOOD PRESSURE: 140 MMHG | BODY MASS INDEX: 51.7 KG/M2

## 2021-07-28 DIAGNOSIS — M79.652 LEFT THIGH PAIN: ICD-10-CM

## 2021-07-28 DIAGNOSIS — F33.0 MILD EPISODE OF RECURRENT MAJOR DEPRESSIVE DISORDER (HCC): Primary | ICD-10-CM

## 2021-07-28 DIAGNOSIS — G43.509 PERSISTENT MIGRAINE AURA WITHOUT CEREBRAL INFARCTION AND WITHOUT STATUS MIGRAINOSUS, NOT INTRACTABLE: ICD-10-CM

## 2021-07-28 DIAGNOSIS — I10 ESSENTIAL HYPERTENSION: ICD-10-CM

## 2021-07-28 DIAGNOSIS — K21.9 GASTROESOPHAGEAL REFLUX DISEASE WITHOUT ESOPHAGITIS: ICD-10-CM

## 2021-07-28 DIAGNOSIS — R63.5 WEIGHT GAIN: ICD-10-CM

## 2021-07-28 DIAGNOSIS — Z13.1 SCREENING FOR DIABETES MELLITUS: ICD-10-CM

## 2021-07-28 DIAGNOSIS — E66.01 MORBID (SEVERE) OBESITY DUE TO EXCESS CALORIES (HCC): ICD-10-CM

## 2021-07-28 DIAGNOSIS — R53.83 FATIGUE, UNSPECIFIED TYPE: ICD-10-CM

## 2021-07-28 PROCEDURE — 99214 OFFICE O/P EST MOD 30 MIN: CPT | Performed by: NURSE PRACTITIONER

## 2021-07-28 RX ORDER — LANSOPRAZOLE 30 MG/1
30 CAPSULE, DELAYED RELEASE ORAL DAILY
Qty: 30 CAPSULE | Refills: 5 | Status: SHIPPED | OUTPATIENT
Start: 2021-07-28 | End: 2022-02-17

## 2021-07-28 RX ORDER — MULTIPLE VITAMINS W/ MINERALS TAB 9MG-400MCG
1 TAB ORAL DAILY
COMMUNITY

## 2021-07-28 RX ORDER — DIPHENHYDRAMINE HYDROCHLORIDE 25 MG/1
TABLET ORAL
COMMUNITY

## 2021-07-28 RX ORDER — SERTRALINE HYDROCHLORIDE 100 MG/1
100 TABLET, FILM COATED ORAL DAILY
COMMUNITY
Start: 2021-05-26 | End: 2022-03-21 | Stop reason: SDUPTHER

## 2021-07-28 RX ORDER — PROPRANOLOL HYDROCHLORIDE 10 MG/1
10 TABLET ORAL 2 TIMES DAILY
Qty: 60 TABLET | Refills: 1 | Status: SHIPPED | OUTPATIENT
Start: 2021-07-28 | End: 2021-12-07

## 2021-07-28 RX ORDER — BUPROPION HYDROCHLORIDE 150 MG/1
150 TABLET ORAL EVERY MORNING
Qty: 30 TABLET | Refills: 1 | Status: SHIPPED | OUTPATIENT
Start: 2021-07-28 | End: 2022-03-21

## 2021-07-28 NOTE — PROGRESS NOTES
New Patient History and Physical      Referring Physician: No ref. provider found    Subjective     Chief Complaint:    Chief Complaint   Patient presents with   • Establish Care     left leg and foot pain       History of Present Illness:   Prior patient of Dr. De La Torre. Last seen a few months ago.   She has hx of HTN. Not on meds. She notes when she gets mad or upset she can feel it getting elevated. She will have left ear pain and facial numbness during those episodes. She used to take meds but not sure of the name.   She has hx of asthma. Worse when she was younger. Does require steroids when she is sick due to cough.   GERD, controlled with prevacid  Takes zoloft. Was increased a few months ago. She notes she still feels down and depressed. She is pisano. Irritable.   She vapes  She admits to binge eating.  Has been gaining weight.  Does have history of gastric sleeve surgery  She has almost daily headaches, has also been having some migraines, she will have light and sound sensititve.     She notes left thigh pain, sharp, random, anterior, last a few minutes. She will sometimes rub over the site and it will feel hot but does not stay hot. Does have intermittent back pain but not in relation to this. This is random. No nocturnal pain.   She was having left mid foot pain as well but that has resolved after taking steroids. She went to UC and they thought maybe could be gout    Review of Systems   Gen- No fevers, chills  CV- No chest pain, palpitations  Resp- No cough, dyspnea  GI- No N/V/D, abd pain  Neuro-No dizziness    Past Medical History:   Past Medical History:   Diagnosis Date   • Abnormal liver enzymes    • Asthma     Positive methacholine challenge test.  Frequent bronchitis.   • Depression    • Eosinophilia     Resolved in the past once off of Singulair.   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/2016   • GERD (gastroesophageal reflux disease)     w/o esophagitis   •  Hypertension    • Morbid obesity (CMS/HCC)    • Obesity      Negative sleep study in the past, no nocturnal hypoxemia on the study   • Peripheral edema 2016   • Renal calculi    • Sleep apnea        Past Surgical History:  Past Surgical History:   Procedure Laterality Date   •  SECTION     • CHOLECYSTECTOMY     • GASTRIC SLEEVE LAPAROSCOPIC     • KIDNEY STONE SURGERY     • TONSILLECTOMY         Family History: family history includes Alcohol abuse in an other family member; Cancer in an other family member; Diabetes in her father; Hypertension in her father and other family members.    Social History:  reports that she has never smoked. She has never used smokeless tobacco. She reports that she does not drink alcohol and does not use drugs.    Medications:    Current Outpatient Medications:   •  Biotin (Biotin 5000) 5 MG capsule, Take  by mouth., Disp: , Rfl:   •  lansoprazole (PREVACID) 30 MG capsule, Take 1 capsule by mouth Daily., Disp: 30 capsule, Rfl: 5  •  loratadine (CLARITIN) 10 MG tablet, TAKE 1 TABLET BY MOUTH EVERY DAY, Disp: 30 tablet, Rfl: 0  •  multivitamin with minerals tablet tablet, Take 1 tablet by mouth Daily., Disp: , Rfl:   •  sertraline (ZOLOFT) 100 MG tablet, Take 100 mg by mouth Daily., Disp: , Rfl:   •  vitamin D3 125 MCG (5000 UT) capsule capsule, Take 5,000 Units by mouth Daily., Disp: , Rfl:   •  buPROPion XL (Wellbutrin XL) 150 MG 24 hr tablet, Take 1 tablet by mouth Every Morning., Disp: 30 tablet, Rfl: 1  •  propranolol (INDERAL) 10 MG tablet, Take 1 tablet by mouth 2 (Two) Times a Day., Disp: 60 tablet, Rfl: 1  •  ubrogepant (ubrogepant) 100 MG tablet, Take 1 tablet by mouth 1 (One) Time As Needed (migraine)., Disp: 15 tablet, Rfl: 1    Allergies:  Allergies   Allergen Reactions   • Doxycycline Nausea Only   • Nsaids    • Amoxicillin Rash   • Penicillins Rash       Objective     Vital Signs:   Vitals:    21 1549   BP: 140/86   Pulse: 83   Resp: 20   Temp: 98.2 °F  "(36.8 °C)   SpO2: 98%   Weight: 132 kg (291 lb 12.8 oz)   Height: 160 cm (63\")       Physical Exam:    Physical Exam  Vitals and nursing note reviewed.   Constitutional:       Appearance: She is well-developed. She is obese.   HENT:      Head: Normocephalic and atraumatic.      Right Ear: Tympanic membrane, ear canal and external ear normal.      Left Ear: Tympanic membrane, ear canal and external ear normal.      Nose: Nose normal.      Mouth/Throat:      Pharynx: Uvula midline.   Eyes:      General: Lids are normal. No scleral icterus.     Conjunctiva/sclera: Conjunctivae normal.      Pupils: Pupils are equal, round, and reactive to light.   Neck:      Thyroid: No thyromegaly.      Vascular: No carotid bruit.      Trachea: No tracheal deviation.   Cardiovascular:      Rate and Rhythm: Normal rate and regular rhythm.      Heart sounds: Normal heart sounds. No murmur heard.   No friction rub. No gallop.    Pulmonary:      Effort: Pulmonary effort is normal.      Breath sounds: Normal breath sounds.   Abdominal:      General: Bowel sounds are normal. There is no distension.      Palpations: Abdomen is soft.      Tenderness: There is no abdominal tenderness.   Musculoskeletal:      Cervical back: Neck supple.   Lymphadenopathy:      Head:      Right side of head: No submental, submandibular, tonsillar, preauricular or posterior auricular adenopathy.      Left side of head: No submental, submandibular, tonsillar, preauricular or posterior auricular adenopathy.      Cervical: No cervical adenopathy.   Skin:     General: Skin is warm and dry.      Capillary Refill: Capillary refill takes less than 2 seconds.      Findings: No rash.   Neurological:      General: No focal deficit present.      Mental Status: She is alert and oriented to person, place, and time.      Cranial Nerves: No cranial nerve deficit.      Sensory: No sensory deficit.   Psychiatric:         Mood and Affect: Mood normal.         Behavior: Behavior " normal.         Assessment / Plan     Assessment/Plan:   Problem List Items Addressed This Visit        Cardiac and Vasculature    Hypertension    Relevant Medications    propranolol (INDERAL) 10 MG tablet    Other Relevant Orders    Comprehensive Metabolic Panel    CBC Auto Differential       Gastrointestinal Abdominal     Gastroesophageal reflux disease without esophagitis    Relevant Medications    lansoprazole (PREVACID) 30 MG capsule       Mental Health    Depression - Primary    Relevant Medications    sertraline (ZOLOFT) 100 MG tablet    buPROPion XL (Wellbutrin XL) 150 MG 24 hr tablet       Neuro    Persistent migraine aura without cerebral infarction and without status migrainosus, not intractable    Relevant Medications    sertraline (ZOLOFT) 100 MG tablet    buPROPion XL (Wellbutrin XL) 150 MG 24 hr tablet    propranolol (INDERAL) 10 MG tablet    ubrogepant (ubrogepant) 100 MG tablet    Other Relevant Orders    MRI brain wo contrast      Other Visit Diagnoses     Fatigue, unspecified type        Relevant Orders    Vitamin B12    Vitamin D 25 Hydroxy    Folate    TSH Rfx On Abnormal To Free T4    Morbid (severe) obesity due to excess calories (CMS/HCC)        Relevant Orders    Hemoglobin A1c    Screening for diabetes mellitus        Relevant Orders    Hemoglobin A1c    Left thigh pain        Relevant Orders    XR femur 2 vw left    Weight gain            -- orders as above  --Mood not controlled.  Add Wellbutrin.  Continue Zoloft.  Can consider mood stabilizer such as Trileptal in the future. - Medication discussed, go to ED for any SI/HI, encouraged counseling/CBT, encouraged clean eating and exercise  --Check labs as above  --Start propanolol to help with blood pressure and headache prevention.  Check MRI.  will try to approve Ubrevly.  Would like to avoid triptans due to hypertension.   --Continue Prevacid. - Avoid eating spicy foods, Avoid caffeinated beverages, Avoid carbonated beverage, Do not eat  or drink within 2-3 hours of going to bed in the evening, Elevate head of bed on 4-6 inch blocks, Eat smaller portions of food throughout the day  --Unsure of exact etiology of her thigh pain.  Check x-ray to rule out any bony lesions.  Discussed could be coming from back.  --Encouraged her to cut back on sugary and starchy foods.  Drink mostly water.  Try to maintain activity.  Encouraged her to follow back up with her bariatric specialist    I have reviewed pertinent health maintenance applicable to this patient.    Follow up:  Return in about 4 weeks (around 8/25/2021).  Follow-up med changes    Electronically signed by JEAN Rob   07/28/2021 16:14 EDT      Please note that portions of this note may have been completed with a voice recognition program. Efforts were made to edit the dictations, but occasionally words are mistranscribed.

## 2021-07-29 LAB
25(OH)D3+25(OH)D2 SERPL-MCNC: 46.6 NG/ML (ref 30–100)
ALBUMIN SERPL-MCNC: 4.3 G/DL (ref 3.8–4.8)
ALBUMIN/GLOB SERPL: 1.2 {RATIO} (ref 1.2–2.2)
ALP SERPL-CCNC: 128 IU/L (ref 48–121)
ALT SERPL-CCNC: 23 IU/L (ref 0–32)
AST SERPL-CCNC: 18 IU/L (ref 0–40)
BASOPHILS # BLD AUTO: 0.1 X10E3/UL (ref 0–0.2)
BASOPHILS NFR BLD AUTO: 1 %
BILIRUB SERPL-MCNC: 0.6 MG/DL (ref 0–1.2)
BUN SERPL-MCNC: 8 MG/DL (ref 6–20)
BUN/CREAT SERPL: 10 (ref 9–23)
CALCIUM SERPL-MCNC: 9.2 MG/DL (ref 8.7–10.2)
CHLORIDE SERPL-SCNC: 102 MMOL/L (ref 96–106)
CO2 SERPL-SCNC: 23 MMOL/L (ref 20–29)
CREAT SERPL-MCNC: 0.81 MG/DL (ref 0.57–1)
EOSINOPHIL # BLD AUTO: 0 X10E3/UL (ref 0–0.4)
EOSINOPHIL NFR BLD AUTO: 0 %
ERYTHROCYTE [DISTWIDTH] IN BLOOD BY AUTOMATED COUNT: 15.7 % (ref 11.7–15.4)
FOLATE SERPL-MCNC: 8.6 NG/ML
GLOBULIN SER CALC-MCNC: 3.5 G/DL (ref 1.5–4.5)
GLUCOSE SERPL-MCNC: 89 MG/DL (ref 65–99)
HBA1C MFR BLD: 5.7 % (ref 4.8–5.6)
HCT VFR BLD AUTO: 42 % (ref 34–46.6)
HGB BLD-MCNC: 12.9 G/DL (ref 11.1–15.9)
IMM GRANULOCYTES # BLD AUTO: 0.1 X10E3/UL (ref 0–0.1)
IMM GRANULOCYTES NFR BLD AUTO: 1 %
LYMPHOCYTES # BLD AUTO: 3 X10E3/UL (ref 0.7–3.1)
LYMPHOCYTES NFR BLD AUTO: 22 %
MCH RBC QN AUTO: 25.3 PG (ref 26.6–33)
MCHC RBC AUTO-ENTMCNC: 30.7 G/DL (ref 31.5–35.7)
MCV RBC AUTO: 82 FL (ref 79–97)
MONOCYTES # BLD AUTO: 0.6 X10E3/UL (ref 0.1–0.9)
MONOCYTES NFR BLD AUTO: 5 %
NEUTROPHILS # BLD AUTO: 9.5 X10E3/UL (ref 1.4–7)
NEUTROPHILS NFR BLD AUTO: 71 %
PLATELET # BLD AUTO: 322 X10E3/UL (ref 150–450)
POTASSIUM SERPL-SCNC: 4.1 MMOL/L (ref 3.5–5.2)
PROT SERPL-MCNC: 7.8 G/DL (ref 6–8.5)
RBC # BLD AUTO: 5.1 X10E6/UL (ref 3.77–5.28)
SODIUM SERPL-SCNC: 140 MMOL/L (ref 134–144)
TSH SERPL DL<=0.005 MIU/L-ACNC: 1.44 UIU/ML (ref 0.45–4.5)
VIT B12 SERPL-MCNC: 1216 PG/ML (ref 232–1245)
WBC # BLD AUTO: 13.2 X10E3/UL (ref 3.4–10.8)

## 2021-07-30 ENCOUNTER — PATIENT ROUNDING (BHMG ONLY) (OUTPATIENT)
Dept: FAMILY MEDICINE CLINIC | Facility: CLINIC | Age: 31
End: 2021-07-30

## 2021-07-30 NOTE — PROGRESS NOTES
"July 30, 2021    Hello, may I speak with Carolyn Theodore?    My name is Maribel Nassar       I am  with [facility]  [facility]  [address] [place] [address] [address] [place] [address] [phone].    Before we get started may I verify your date of birth? 1990    I am calling to officially welcome you to our practice and ask about your recent visit. Is this a good time to talk? Yes    Tell me about your visit with us. What things went well?  Patient stated \"It was great. I really liked Marie.\"       We're always looking for ways to make our patients' experiences even better. Do you have recommendations on ways we may improve?  Patient stated \"No, not that I can think of.\"    Overall were you satisfied with your first visit to our practice? Yes       I appreciate you taking the time to speak with me today. Is there anything else I can do for you? No      Thank you, and have a great day.      "

## 2021-07-30 NOTE — PROGRESS NOTES
Please Call Patient. **  Labs show elevated alkaline phos which is mild and no cause for concern at this time. She is in early pre diabetic range. Just work on decreasing sugary and starchy foods. We can repeat this lab in a year. Her white blood cell count is elevated and has been for several years looking at her trends. Has she ever seen a hematologist? If not, it would be good to check in with them to make sure everything is ok. But overall labs are stable and reassuring.

## 2021-08-02 ENCOUNTER — PRIOR AUTHORIZATION (OUTPATIENT)
Dept: FAMILY MEDICINE CLINIC | Facility: CLINIC | Age: 31
End: 2021-08-02

## 2021-08-02 ENCOUNTER — TELEPHONE (OUTPATIENT)
Dept: FAMILY MEDICINE CLINIC | Facility: CLINIC | Age: 31
End: 2021-08-02

## 2021-08-20 ENCOUNTER — HOSPITAL ENCOUNTER (OUTPATIENT)
Dept: MRI IMAGING | Facility: HOSPITAL | Age: 31
Discharge: HOME | End: 2021-08-20
Admitting: NURSE PRACTITIONER

## 2021-08-20 DIAGNOSIS — G43.509 PERSISTENT MIGRAINE AURA WITHOUT CEREBRAL INFARCTION AND WITHOUT STATUS MIGRAINOSUS, NOT INTRACTABLE: ICD-10-CM

## 2021-08-20 PROCEDURE — 70551 MRI BRAIN STEM W/O DYE: CPT

## 2021-10-10 DIAGNOSIS — J30.2 ACUTE SEASONAL ALLERGIC RHINITIS: ICD-10-CM

## 2021-10-11 ENCOUNTER — TELEPHONE (OUTPATIENT)
Dept: FAMILY MEDICINE CLINIC | Facility: CLINIC | Age: 31
End: 2021-10-11

## 2021-10-11 RX ORDER — LORATADINE 10 MG/1
10 TABLET ORAL DAILY
Qty: 30 TABLET | Refills: 0 | OUTPATIENT
Start: 2021-10-11

## 2021-10-11 RX ORDER — FLUCONAZOLE 150 MG/1
TABLET ORAL
Qty: 2 TABLET | Refills: 0 | Status: SHIPPED | OUTPATIENT
Start: 2021-10-11 | End: 2022-08-03 | Stop reason: SDUPTHER

## 2021-10-11 NOTE — TELEPHONE ENCOUNTER
PT CALLED STATING SHE HAS A YEAST INFECTION THAT STARTED OVER THE WEEKEND AND IS GETTING WORSE. PT ASKED FOR DIFLUCAN.     [facility].       PLEASE ADVISE

## 2021-10-12 DIAGNOSIS — G43.509 PERSISTENT MIGRAINE AURA WITHOUT CEREBRAL INFARCTION AND WITHOUT STATUS MIGRAINOSUS, NOT INTRACTABLE: ICD-10-CM

## 2021-10-12 RX ORDER — UBROGEPANT 100 MG/1
TABLET ORAL
Qty: 15 TABLET | Refills: 0 | Status: SHIPPED | OUTPATIENT
Start: 2021-10-12 | End: 2022-11-14

## 2021-12-06 ENCOUNTER — TELEPHONE (OUTPATIENT)
Dept: FAMILY MEDICINE CLINIC | Facility: CLINIC | Age: 31
End: 2021-12-06

## 2021-12-06 RX ORDER — ALBUTEROL SULFATE 2.5 MG/3ML
2.5 SOLUTION RESPIRATORY (INHALATION) EVERY 4 HOURS PRN
Qty: 180 ML | Refills: 0 | Status: SHIPPED | OUTPATIENT
Start: 2021-12-06

## 2021-12-06 RX ORDER — PREDNISONE 20 MG/1
40 TABLET ORAL DAILY
Qty: 10 TABLET | Refills: 0 | Status: SHIPPED | OUTPATIENT
Start: 2021-12-06 | End: 2021-12-11

## 2021-12-06 NOTE — TELEPHONE ENCOUNTER
PT CALLED ASKING FOR A REFILL ON ALBUTEROL FOR HER NEBULIZER AND PREDNISONE. PT WENT TO  OVER THE WEEKEND AND TESTED FOR FLU A.       PLEASE ADVISE

## 2021-12-07 ENCOUNTER — OFFICE VISIT (OUTPATIENT)
Dept: FAMILY MEDICINE CLINIC | Facility: CLINIC | Age: 31
End: 2021-12-07

## 2021-12-07 VITALS
HEIGHT: 62 IN | BODY MASS INDEX: 52.81 KG/M2 | TEMPERATURE: 97.2 F | HEART RATE: 70 BPM | SYSTOLIC BLOOD PRESSURE: 130 MMHG | WEIGHT: 287 LBS | OXYGEN SATURATION: 98 % | DIASTOLIC BLOOD PRESSURE: 84 MMHG

## 2021-12-07 DIAGNOSIS — R05.9 COUGH: Primary | ICD-10-CM

## 2021-12-07 DIAGNOSIS — J10.1 INFLUENZA A: ICD-10-CM

## 2021-12-07 PROCEDURE — 96372 THER/PROPH/DIAG INJ SC/IM: CPT | Performed by: NURSE PRACTITIONER

## 2021-12-07 PROCEDURE — 99213 OFFICE O/P EST LOW 20 MIN: CPT | Performed by: NURSE PRACTITIONER

## 2021-12-07 RX ORDER — METHYLPREDNISOLONE SODIUM SUCCINATE 125 MG/2ML
125 INJECTION, POWDER, LYOPHILIZED, FOR SOLUTION INTRAMUSCULAR; INTRAVENOUS ONCE
Status: COMPLETED | OUTPATIENT
Start: 2021-12-07 | End: 2021-12-07

## 2021-12-07 RX ADMIN — METHYLPREDNISOLONE SODIUM SUCCINATE 125 MG: 125 INJECTION, POWDER, LYOPHILIZED, FOR SOLUTION INTRAMUSCULAR; INTRAVENOUS at 13:49

## 2021-12-07 NOTE — PROGRESS NOTES
Subjective     Chief Complaint:    Chief Complaint   Patient presents with   • Cough     Pt diagnosed with Flu A on Saturday.       History of Present Illness:   Tested positive for flu A on Saturday. She notes barkey cough that is worsening. Non stop for 2 days. She is on prednisone, she is taking robistussion cough syrup. She is doing nebs but feels like that is making it worse. She has hx of mild asthma. Notes everytime she get sick she will cough like this for 2-3 weeks. She denies soa. Throat is sore a little from coughing. Feels more like a tickle. Ears feel full. Feverish on Sunday. Did complete tamiflu. + rhinorrhea, + post tussive emesis  Her son tested positive for RSV.       Review of Systems  As above      I have reviewed and/or updated the patient's past medical, surgical, family, social history and problem list as appropriate.     Medications:    Current Outpatient Medications:   •  albuterol (PROVENTIL) (2.5 MG/3ML) 0.083% nebulizer solution, Take 2.5 mg by nebulization Every 4 (Four) Hours As Needed for Wheezing., Disp: 180 mL, Rfl: 0  •  Biotin (Biotin 5000) 5 MG capsule, Take  by mouth., Disp: , Rfl:   •  buPROPion XL (Wellbutrin XL) 150 MG 24 hr tablet, Take 1 tablet by mouth Every Morning., Disp: 30 tablet, Rfl: 1  •  fluconazole (Diflucan) 150 MG tablet, Take 1 tablet by mouth now, may repeat in 3 days if needed, Disp: 2 tablet, Rfl: 0  •  lansoprazole (PREVACID) 30 MG capsule, Take 1 capsule by mouth Daily., Disp: 30 capsule, Rfl: 5  •  loratadine (CLARITIN) 10 MG tablet, TAKE 1 TABLET BY MOUTH EVERY DAY, Disp: 30 tablet, Rfl: 0  •  multivitamin with minerals tablet tablet, Take 1 tablet by mouth Daily., Disp: , Rfl:   •  predniSONE (DELTASONE) 20 MG tablet, Take 2 tablets by mouth Daily for 5 days., Disp: 10 tablet, Rfl: 0  •  sertraline (ZOLOFT) 100 MG tablet, Take 100 mg by mouth Daily., Disp: , Rfl:   •  ubrogepant 100 MG tablet, TAKE 1 TABLET BY MOUTH ONE TIME AS NEEDED FOR MIGRAINE,  "Disp: 15 tablet, Rfl: 0  •  vitamin D3 125 MCG (5000 UT) capsule capsule, Take 5,000 Units by mouth Daily., Disp: , Rfl:   •  HYDROcod Polst-CPM Polst ER (Tussionex Pennkinetic ER) 10-8 MG/5ML ER suspension, Take 5 mL by mouth At Night As Needed for Cough., Disp: 60 mL, Rfl: 0  No current facility-administered medications for this visit.    Allergies:  Allergies   Allergen Reactions   • Doxycycline Nausea Only   • Nsaids    • Amoxicillin Rash   • Penicillins Rash       Objective     Vital Signs:   Vitals:    12/07/21 1310   BP: 130/84   Pulse: 70   Temp: 97.2 °F (36.2 °C)   SpO2: 98%   Weight: 130 kg (287 lb)   Height: 157.5 cm (62\")   PainSc:   4     Body mass index is 52.49 kg/m².    Physical Exam:    Physical Exam  Constitutional:       Appearance: Normal appearance. She is well-developed.   HENT:      Head: Normocephalic and atraumatic.      Right Ear: Ear canal and external ear normal. A middle ear effusion (trace) is present.      Left Ear: Ear canal and external ear normal. A middle ear effusion (trace) is present.   Eyes:      Pupils: Pupils are equal, round, and reactive to light.   Cardiovascular:      Rate and Rhythm: Normal rate and regular rhythm.      Heart sounds: Normal heart sounds. No murmur heard.  No friction rub. No gallop.    Pulmonary:      Effort: Pulmonary effort is normal.      Breath sounds: Normal breath sounds.      Comments: Frequent barking seal like cough  Abdominal:      General: Bowel sounds are normal.      Palpations: Abdomen is soft.      Tenderness: There is no abdominal tenderness.   Musculoskeletal:      Cervical back: Neck supple.   Lymphadenopathy:      Head:      Right side of head: No submental, submandibular, tonsillar, preauricular or posterior auricular adenopathy.      Left side of head: No submental, submandibular, tonsillar, preauricular or posterior auricular adenopathy.      Cervical: No cervical adenopathy.   Skin:     General: Skin is warm and dry. "   Neurological:      Mental Status: She is alert and oriented to person, place, and time.   Psychiatric:         Behavior: Behavior normal.         Assessment / Plan     Assessment/Plan:   Problem List Items Addressed This Visit     None      Visit Diagnoses     Cough    -  Primary    Relevant Medications    methylPREDNISolone sodium succinate (SOLU-Medrol) injection 125 mg (Completed)    HYDROcod Polst-CPM Polst ER (Tussionex Pennkinetic ER) 10-8 MG/5ML ER suspension    Influenza A            -- I question her actually diagnosis of flu given son was positive for RSV. Nonetheless her issue at this time is persistent barking cough. Her lungs are clear and she is saturating well which is reassuring. Will give solu-medrol x 1. Continue prednisone burst. Ok to use tussionex at bedtime only. During the day can use mucinex, continue nebs prn.   -- RTC if no improvement of worsens    Follow up:  As needed    Electronically signed by JEAN Rob   12/07/2021 13:29 EST      Please note that portions of this note may have been completed with a voice recognition program. Efforts were made to edit the dictations, but occasionally words are mistranscribed.

## 2022-02-17 DIAGNOSIS — K21.9 GASTROESOPHAGEAL REFLUX DISEASE WITHOUT ESOPHAGITIS: ICD-10-CM

## 2022-02-17 RX ORDER — LANSOPRAZOLE 30 MG/1
30 CAPSULE, DELAYED RELEASE ORAL DAILY
Qty: 30 CAPSULE | Refills: 4 | Status: SHIPPED | OUTPATIENT
Start: 2022-02-17 | End: 2022-07-13

## 2022-03-19 DIAGNOSIS — J30.2 ACUTE SEASONAL ALLERGIC RHINITIS: ICD-10-CM

## 2022-03-21 ENCOUNTER — OFFICE VISIT (OUTPATIENT)
Dept: FAMILY MEDICINE CLINIC | Facility: CLINIC | Age: 32
End: 2022-03-21

## 2022-03-21 VITALS
DIASTOLIC BLOOD PRESSURE: 90 MMHG | TEMPERATURE: 97.2 F | WEIGHT: 293 LBS | SYSTOLIC BLOOD PRESSURE: 135 MMHG | OXYGEN SATURATION: 99 % | HEART RATE: 95 BPM | HEIGHT: 62 IN | BODY MASS INDEX: 53.92 KG/M2

## 2022-03-21 DIAGNOSIS — J06.9 VIRAL URI WITH COUGH: ICD-10-CM

## 2022-03-21 DIAGNOSIS — J45.21 MILD INTERMITTENT REACTIVE AIRWAY DISEASE WITH ACUTE EXACERBATION: Primary | ICD-10-CM

## 2022-03-21 DIAGNOSIS — F33.0 MILD EPISODE OF RECURRENT MAJOR DEPRESSIVE DISORDER: ICD-10-CM

## 2022-03-21 PROCEDURE — 99214 OFFICE O/P EST MOD 30 MIN: CPT | Performed by: NURSE PRACTITIONER

## 2022-03-21 PROCEDURE — 96372 THER/PROPH/DIAG INJ SC/IM: CPT | Performed by: NURSE PRACTITIONER

## 2022-03-21 RX ORDER — MULTIVIT-MIN/IRON/FOLIC ACID/K 18-600-40
CAPSULE ORAL
COMMUNITY

## 2022-03-21 RX ORDER — METHYLPREDNISOLONE SODIUM SUCCINATE 125 MG/2ML
125 INJECTION, POWDER, LYOPHILIZED, FOR SOLUTION INTRAMUSCULAR; INTRAVENOUS ONCE
Status: COMPLETED | OUTPATIENT
Start: 2022-03-21 | End: 2022-03-21

## 2022-03-21 RX ORDER — SERTRALINE HYDROCHLORIDE 100 MG/1
150 TABLET, FILM COATED ORAL DAILY
Qty: 45 TABLET | Refills: 5 | Status: SHIPPED | OUTPATIENT
Start: 2022-03-21 | End: 2022-05-20 | Stop reason: ALTCHOICE

## 2022-03-21 RX ORDER — PREDNISONE 20 MG/1
20 TABLET ORAL DAILY
Qty: 5 TABLET | Refills: 0 | Status: SHIPPED | OUTPATIENT
Start: 2022-03-22 | End: 2022-03-27

## 2022-03-21 RX ORDER — SERTRALINE HYDROCHLORIDE 100 MG/1
TABLET, FILM COATED ORAL
Qty: 30 TABLET | Refills: 1 | OUTPATIENT
Start: 2022-03-21

## 2022-03-21 RX ORDER — LORATADINE 10 MG/1
TABLET ORAL
Qty: 30 TABLET | Refills: 2 | OUTPATIENT
Start: 2022-03-21

## 2022-03-21 RX ADMIN — METHYLPREDNISOLONE SODIUM SUCCINATE 125 MG: 125 INJECTION, POWDER, LYOPHILIZED, FOR SOLUTION INTRAMUSCULAR; INTRAVENOUS at 13:05

## 2022-03-21 NOTE — PROGRESS NOTES
Subjective     Chief Complaint:    Chief Complaint   Patient presents with   • Sore Throat   • Cough   • Shortness of Breath     Pt sts she thinks it's bronchitis.        History of Present Illness:   Sick since Friday, but worsening over the weekend  She notes sore throat, cough, chest is burning, lightheadeness, loosing voice, barking cough, no runny nose, + PND, ears feel itching. She has taken claritin.   She has not had covid or been vaccinated  She was seen at urgent care yesterday, covid swab was negative, felt viral, no meds were given  Cough is worse during the day  She is prone to bronchitits.     She would like to go up on Zoloft.  Did not tolerate Wellbutrin    Review of Systems  Gen- No fevers, chills  CV- No chest pain, palpitations  Resp- No cough, dyspnea  GI- No N/V/D, abd pain  Neuro-No dizziness, headaches      I have reviewed and/or updated the patient's past medical, surgical, family, social history and problem list as appropriate.     Medications:    Current Outpatient Medications:   •  albuterol (PROVENTIL) (2.5 MG/3ML) 0.083% nebulizer solution, Take 2.5 mg by nebulization Every 4 (Four) Hours As Needed for Wheezing., Disp: 180 mL, Rfl: 0  •  Ascorbic Acid (Vitamin C) 500 MG capsule, Take  by mouth., Disp: , Rfl:   •  Biotin 5 MG capsule, Take  by mouth., Disp: , Rfl:   •  fluconazole (Diflucan) 150 MG tablet, Take 1 tablet by mouth now, may repeat in 3 days if needed, Disp: 2 tablet, Rfl: 0  •  lansoprazole (PREVACID) 30 MG capsule, TAKE 1 CAPSULE BY MOUTH DAILY., Disp: 30 capsule, Rfl: 4  •  loratadine (CLARITIN) 10 MG tablet, TAKE 1 TABLET BY MOUTH EVERY DAY, Disp: 30 tablet, Rfl: 0  •  multivitamin with minerals tablet tablet, Take 1 tablet by mouth Daily., Disp: , Rfl:   •  sertraline (ZOLOFT) 100 MG tablet, Take 1.5 tablets by mouth Daily., Disp: 45 tablet, Rfl: 5  •  ubrogepant 100 MG tablet, TAKE 1 TABLET BY MOUTH ONE TIME AS NEEDED FOR MIGRAINE, Disp: 15 tablet, Rfl: 0  •   "vitamin D3 125 MCG (5000 UT) capsule capsule, Take 5,000 Units by mouth Daily., Disp: , Rfl:   •  [START ON 3/22/2022] predniSONE (DELTASONE) 20 MG tablet, Take 1 tablet by mouth Daily for 5 days., Disp: 5 tablet, Rfl: 0  No current facility-administered medications for this visit.    Allergies:  Allergies   Allergen Reactions   • Doxycycline Nausea Only   • Nsaids    • Amoxicillin Rash   • Penicillins Rash       Objective     Vital Signs:   Vitals:    03/21/22 1146   BP: 135/90   Pulse: 95   Temp: 97.2 °F (36.2 °C)   SpO2: 99%   Weight: 135 kg (298 lb 6.4 oz)   Height: 157.5 cm (62\")   PainSc: 0-No pain     Body mass index is 54.58 kg/m².    Physical Exam:    Physical Exam  Constitutional:       Appearance: She is well-developed. She is obese.   HENT:      Head: Normocephalic and atraumatic.      Right Ear: Tympanic membrane, ear canal and external ear normal.      Left Ear: Tympanic membrane, ear canal and external ear normal.      Nose: Nose normal.      Mouth/Throat:      Pharynx: Uvula midline. Posterior oropharyngeal erythema present.   Eyes:      Pupils: Pupils are equal, round, and reactive to light.   Cardiovascular:      Rate and Rhythm: Normal rate and regular rhythm.      Heart sounds: Normal heart sounds. No murmur heard.    No friction rub. No gallop.   Pulmonary:      Effort: Pulmonary effort is normal.      Breath sounds: Wheezing present.   Abdominal:      General: Bowel sounds are normal.      Palpations: Abdomen is soft.      Tenderness: There is no abdominal tenderness.   Musculoskeletal:      Cervical back: Neck supple.   Lymphadenopathy:      Head:      Right side of head: No submental, submandibular, tonsillar, preauricular or posterior auricular adenopathy.      Left side of head: No submental, submandibular, tonsillar, preauricular or posterior auricular adenopathy.      Cervical: No cervical adenopathy.   Skin:     General: Skin is warm and dry.      Capillary Refill: Capillary refill takes " less than 2 seconds.   Neurological:      General: No focal deficit present.      Mental Status: She is alert and oriented to person, place, and time.   Psychiatric:         Mood and Affect: Mood normal.         Behavior: Behavior normal.         Assessment / Plan     Assessment/Plan:   Problem List Items Addressed This Visit        Mental Health    Depression    Relevant Medications    sertraline (ZOLOFT) 100 MG tablet      Other Visit Diagnoses     Mild intermittent reactive airway disease with acute exacerbation    -  Primary    Relevant Medications    predniSONE (DELTASONE) 20 MG tablet (Start on 3/22/2022)    methylPREDNISolone sodium succinate (SOLU-Medrol) injection 125 mg (Completed)    Viral URI with cough            --Steroids and nebs  --Increase Zoloft    Follow up:  As needed    Electronically signed by JEAN Rob   03/21/2022 12:00 EDT      Please note that portions of this note may have been completed with a voice recognition program. Efforts were made to edit the dictations, but occasionally words are mistranscribed.

## 2022-03-24 ENCOUNTER — TELEPHONE (OUTPATIENT)
Dept: FAMILY MEDICINE CLINIC | Facility: CLINIC | Age: 32
End: 2022-03-24

## 2022-03-24 NOTE — TELEPHONE ENCOUNTER
I TALKED TO TITO PANIAGUA AND SHE NEEDS HER MEDS CALLED IN TO [facility] IN [facility]

## 2022-03-25 RX ORDER — CEFDINIR 300 MG/1
300 CAPSULE ORAL 2 TIMES DAILY
Qty: 14 CAPSULE | Refills: 0 | Status: SHIPPED | OUTPATIENT
Start: 2022-03-25 | End: 2022-04-01

## 2022-05-20 ENCOUNTER — PRIOR AUTHORIZATION (OUTPATIENT)
Dept: FAMILY MEDICINE CLINIC | Facility: CLINIC | Age: 32
End: 2022-05-20

## 2022-05-20 ENCOUNTER — OFFICE VISIT (OUTPATIENT)
Dept: FAMILY MEDICINE CLINIC | Facility: CLINIC | Age: 32
End: 2022-05-20

## 2022-05-20 VITALS
SYSTOLIC BLOOD PRESSURE: 132 MMHG | RESPIRATION RATE: 18 BRPM | DIASTOLIC BLOOD PRESSURE: 76 MMHG | BODY MASS INDEX: 53.92 KG/M2 | HEIGHT: 62 IN | HEART RATE: 102 BPM | TEMPERATURE: 97.6 F | OXYGEN SATURATION: 97 % | WEIGHT: 293 LBS

## 2022-05-20 DIAGNOSIS — R63.2 BINGE EATING: ICD-10-CM

## 2022-05-20 DIAGNOSIS — F41.8 DEPRESSION WITH ANXIETY: Primary | Chronic | ICD-10-CM

## 2022-05-20 PROCEDURE — 99214 OFFICE O/P EST MOD 30 MIN: CPT | Performed by: FAMILY MEDICINE

## 2022-05-20 RX ORDER — BUPROPION HYDROCHLORIDE 100 MG/1
100 TABLET, EXTENDED RELEASE ORAL 2 TIMES DAILY
Qty: 60 TABLET | Refills: 3 | Status: SHIPPED | OUTPATIENT
Start: 2022-05-20 | End: 2022-09-12

## 2022-05-20 NOTE — PROGRESS NOTES
Established Patient        Chief Complaint:   Chief Complaint   Patient presents with   • Obesity        Carolyn Theodore is a 31 y.o. female    History of Present Illness:   Here today for evaluation of mood disorder and difficulty with weight loss.    Patient reports significant weight gain, despite efforts to prevent, while taking sertraline and treatment of her mood disorder.  She continues to deal with both depression and anxiety symptoms,  Denies SI/HI.  She has had a significant amount of decrease in libido while taking sertraline additionally.  This is created significant difficulties in her marriage.    Has tried numerous dietary modifications, including exercise, with limited benefit to her overall weight loss efforts.  She describes stress/binge eating additionally.    Subjective     The following portions of the patient's history were reviewed and updated as appropriate: allergies, current medications, past family history, past medical history, past social history, past surgical history and problem list.    Allergies   Allergen Reactions   • Doxycycline Nausea Only   • Nsaids    • Amoxicillin Rash   • Penicillins Rash       Review of Systems  1. Constitutional: Negative for fever. Negative for chills, diaphoresis, fatigue; unexpected weight gain as per above.  2. HENT: No dysphagia; no changes to vision/hearing/smell/taste; no epistaxis  3. Eyes: Negative for redness and visual disturbance.   4. Respiratory: negative for shortness of breath. Negative for chest pain . Negative for cough and chest tightness.   5. Cardiovascular: Negative for chest pain and palpitations.   6. Gastrointestinal: Negative for abdominal distention, abdominal pain and blood in stool.   7. Endocrine: Negative for cold intolerance and heat intolerance.   8. Genitourinary: Negative for difficulty urinating, dysuria and frequency.  Decreased libido as per above.  9. Musculoskeletal: Negative for arthralgias,  "back pain and myalgias.   10. Skin: Negative for color change, rash and wound.   11. Neurological: Negative for syncope, weakness and headaches.   12. Hematological: Negative for adenopathy. Does not bruise/bleed easily.   13. Psychiatric/Behavioral: Negative for confusion. The patient is not nervous/anxious.    Objective     Physical Exam   Vital Signs: /76   Pulse 102   Temp 97.6 °F (36.4 °C)   Resp 18   Ht 157.5 cm (62\")   Wt (!) 137 kg (302 lb 6.4 oz)   SpO2 97%   BMI 55.31 kg/m²     General Appearance: alert, oriented x 3, no acute distress.  Skin: warm and dry.   HEENT: Atraumatic.  pupils round and reactive to light and accommodation, oral mucosa pink and moist.  Nares patent without epistaxis.  External auditory canals are patent tympanic membranes intact.  Neck: supple, no JVD, trachea midline.  No thyromegaly  Lungs: CTA, unlabored breathing effort.  Heart: RRR, normal S1 and S2, no S3, no rub.  Abdomen: soft, non-tender, no palpable bladder, present bowel sounds to auscultation ×4.  No guarding or rigidity.  Extremities: no clubbing, cyanosis or edema.  Good range of motion actively and passively.  Symmetric muscle strength and development  Neuro: normal speech and mental status.  Cranial nerves II through XII intact.  No anosmia. DTR 2+; proprioception intact.  No focal motor/sensory deficits.    Advance Care Planning   ACP discussion was held with the patient during this visit. Patient does not have an advance directive, information provided.     Assessment and Plan      Assessment/Plan:   Diagnoses and all orders for this visit:    1. Depression with anxiety (Primary)  -     buPROPion SR (Wellbutrin SR) 100 MG 12 hr tablet; Take 1 tablet by mouth 2 (Two) Times a Day.  Dispense: 60 tablet; Refill: 3    2. Binge eating  -     lisdexamfetamine (VYVANSE) 20 MG capsule; Take 1 capsule by mouth Every Morning  Dispense: 30 capsule; Refill: 0      Hopeful for improved libido with discontinuation " of sertraline.    Start bupropion SR formulation; will follow clinically.  I have asked that she notify the office should her mood symptoms worsen, or if she develops any other ill effects to the new medication.    Discussed need for stress/anxiety reducing techniques such as prayer/meditation/breathing and counting exercises and avoidance of stress producing environments/situations; will follow clinically.    Discussed need for reduction in sodium/salt/caffeine intake; improve sleep habits as able; inc formal CV exercise program with goal of vigorous activity most, if not all, days of the week; goal of 250 min of sustained HR CV exercise total per week.    Start vyvanse 20 mg daily dosing.  I have asked that she notify the office should she develop any ill effects to the new medication.  Planned titration of dosing as tolerated.      Discussion Summary:      I spent 30 minutes caring for Carolyn on this date of service. This time includes time spent by me in the following activities:preparing for the visit, performing a medically appropriate examination and/or evaluation , counseling and educating the patient/family/caregiver, ordering medications, tests, or procedures, documenting information in the medical record and care coordination    Discussed plan of care in detail with pt today; pt verb understanding and agrees.  Follow up:  No follow-ups on file.     There are no Patient Instructions on file for this visit.    Clement Velasco DO  05/20/22  15:05 EDT          Please note that portions of this note may have been completed with a voice recognition program. Efforts were made to edit the dictations, but occasionally words are mistranscribed.

## 2022-05-20 NOTE — TELEPHONE ENCOUNTER
A PRIOR AUTH HAS BEEN STARTED THROUGH [facility] FOR VYVANSE.    WAITING ON A RESPONSE FROM THE INSURANCE.    Key: [id number]

## 2022-06-01 ENCOUNTER — TELEPHONE (OUTPATIENT)
Dept: FAMILY MEDICINE CLINIC | Facility: CLINIC | Age: 32
End: 2022-06-01

## 2022-06-01 DIAGNOSIS — R40.0 HAS DAYTIME DROWSINESS: ICD-10-CM

## 2022-06-01 DIAGNOSIS — R63.2 BINGE EATING: Chronic | ICD-10-CM

## 2022-06-01 DIAGNOSIS — E66.01 CLASS 3 SEVERE OBESITY DUE TO EXCESS CALORIES WITH SERIOUS COMORBIDITY AND BODY MASS INDEX (BMI) OF 50.0 TO 59.9 IN ADULT: Primary | ICD-10-CM

## 2022-06-01 NOTE — TELEPHONE ENCOUNTER
"PATIENT CALLED STATING THAT THE PRIOR AUTH FOR HER VYVANCE WAS DECLINED DUE TO THE DIAGNOSIS CODE FOR \"BINGE EATING\" CAN NOT STAND ALONE. PATIENT STATED HER AND DR SAENZ ALSO DISCUSSED HER BEING FATIGUE AND ISSUES STAYING AWAKE, AND THOSE ARE REASONS THAT CAN BE ADDED TO THE PRIOR AUTHORIZATION TO BE APPROVED. PLEASE RESUBMIT PRIOR AUTH AS URGENT PER PATIENTS INSURANCE      lisdexamfetamine (VYVANSE) 20 MG capsule    [facility] [address] [place] [address] [phone] [facility] [phone] FX     PER PATIENTS MYCHART PRIOR TO GETTING MORE INFO FROM HER INSURANCE COMPANY     \"Good morning,   I am writing in regards to my Vyvance prescription.  My pharmacy is saying that it is still isn't approved.  I have also contacted my insurance company and they say they haven't received anything regarding this prescription.   Can someone let me know if this has been sent to the insurance company?\"      "

## 2022-06-08 ENCOUNTER — PRIOR AUTHORIZATION (OUTPATIENT)
Dept: FAMILY MEDICINE CLINIC | Facility: CLINIC | Age: 32
End: 2022-06-08

## 2022-06-08 NOTE — TELEPHONE ENCOUNTER
A PRIOR AUTH HAS BEEN STARTED THROUGH [facility] [facility] FOR VYVANSE.    WAITING ON A RESPONSE FROM THE INSURANCE.    KEY: [id number]

## 2022-06-09 DIAGNOSIS — G47.30 SLEEP APNEA IN ADULT: Primary | Chronic | ICD-10-CM

## 2022-06-09 DIAGNOSIS — E66.01 CLASS 3 SEVERE OBESITY DUE TO EXCESS CALORIES WITH SERIOUS COMORBIDITY AND BODY MASS INDEX (BMI) OF 50.0 TO 59.9 IN ADULT: ICD-10-CM

## 2022-06-09 DIAGNOSIS — R63.2 BINGE EATING: Chronic | ICD-10-CM

## 2022-06-09 DIAGNOSIS — R40.0 HAS DAYTIME DROWSINESS: ICD-10-CM

## 2022-07-13 DIAGNOSIS — J30.2 ACUTE SEASONAL ALLERGIC RHINITIS: ICD-10-CM

## 2022-07-13 DIAGNOSIS — K21.9 GASTROESOPHAGEAL REFLUX DISEASE WITHOUT ESOPHAGITIS: ICD-10-CM

## 2022-07-13 RX ORDER — LANSOPRAZOLE 30 MG/1
CAPSULE, DELAYED RELEASE ORAL
Qty: 90 CAPSULE | Refills: 0 | Status: SHIPPED | OUTPATIENT
Start: 2022-07-13 | End: 2022-07-15 | Stop reason: SDUPTHER

## 2022-07-13 RX ORDER — BENZOYL PEROXIDE
KIT TOPICAL
Qty: 30 TABLET | Refills: 0 | OUTPATIENT
Start: 2022-07-13

## 2022-07-15 ENCOUNTER — OFFICE VISIT (OUTPATIENT)
Dept: FAMILY MEDICINE CLINIC | Facility: CLINIC | Age: 32
End: 2022-07-15

## 2022-07-15 VITALS
RESPIRATION RATE: 16 BRPM | SYSTOLIC BLOOD PRESSURE: 118 MMHG | BODY MASS INDEX: 53.92 KG/M2 | HEIGHT: 62 IN | WEIGHT: 293 LBS | HEART RATE: 103 BPM | DIASTOLIC BLOOD PRESSURE: 70 MMHG | OXYGEN SATURATION: 98 % | TEMPERATURE: 98 F

## 2022-07-15 DIAGNOSIS — J45.30 MILD PERSISTENT ASTHMA WITHOUT COMPLICATION: ICD-10-CM

## 2022-07-15 DIAGNOSIS — E66.01 CLASS 3 SEVERE OBESITY DUE TO EXCESS CALORIES WITH SERIOUS COMORBIDITY AND BODY MASS INDEX (BMI) OF 50.0 TO 59.9 IN ADULT: ICD-10-CM

## 2022-07-15 DIAGNOSIS — R63.2 BINGE EATING: Chronic | ICD-10-CM

## 2022-07-15 DIAGNOSIS — J30.2 ACUTE SEASONAL ALLERGIC RHINITIS: ICD-10-CM

## 2022-07-15 DIAGNOSIS — R40.0 HAS DAYTIME DROWSINESS: ICD-10-CM

## 2022-07-15 DIAGNOSIS — F41.8 DEPRESSION WITH ANXIETY: Primary | Chronic | ICD-10-CM

## 2022-07-15 DIAGNOSIS — K21.9 GASTROESOPHAGEAL REFLUX DISEASE WITHOUT ESOPHAGITIS: ICD-10-CM

## 2022-07-15 PROCEDURE — 99214 OFFICE O/P EST MOD 30 MIN: CPT | Performed by: FAMILY MEDICINE

## 2022-07-15 RX ORDER — DEXTROAMPHETAMINE SACCHARATE, AMPHETAMINE ASPARTATE, DEXTROAMPHETAMINE SULFATE AND AMPHETAMINE SULFATE 2.5; 2.5; 2.5; 2.5 MG/1; MG/1; MG/1; MG/1
10 TABLET ORAL 2 TIMES DAILY
Qty: 60 TABLET | Refills: 0 | Status: SHIPPED | OUTPATIENT
Start: 2022-07-15 | End: 2022-09-12 | Stop reason: SDUPTHER

## 2022-07-15 RX ORDER — LORATADINE 10 MG/1
10 TABLET ORAL DAILY
Qty: 90 TABLET | Refills: 2 | Status: SHIPPED | OUTPATIENT
Start: 2022-07-15 | End: 2022-11-09 | Stop reason: SDUPTHER

## 2022-07-15 RX ORDER — LANSOPRAZOLE 30 MG/1
30 CAPSULE, DELAYED RELEASE ORAL DAILY
Qty: 90 CAPSULE | Refills: 2 | Status: SHIPPED | OUTPATIENT
Start: 2022-07-15

## 2022-07-15 NOTE — PROGRESS NOTES
Established Patient        Chief Complaint:   Chief Complaint   Patient presents with   • Mental Health Problem        Carolyn Theodore is a 31 y.o. female    History of Present Illness:     The patient is in today for scheduled follow-up visit of her mood disorder, GERD, mild persistent reactive airway disease, binge eating, and obesity.    The patient reports not taking the Vyvanse due to the insurance denying it twice. She notes in the past 2 weeks, she is unable to stay awake and is falling asleep at work. She will fall asleep while her children are on her lap watching the television. She reports sleeping well at night.    The patient admits very rarely does a day go by without a headache.    The patient reports numbness and tingling in her bilateral upper extremities and bilateral lower extremities. The tingling occurs in every position she is in, including when she is talking on the phone or sleeping. Her bilateral lower extremities were tingling when she came to the exam room from the waiting room. She believes the tingling may be weight related.     Subjective     The following portions of the patient's history were reviewed and updated as appropriate: allergies, current medications, past family history, past medical history, past social history, past surgical history and problem list.    Allergies   Allergen Reactions   • Doxycycline Nausea Only   • Nsaids    • Amoxicillin Rash   • Penicillins Rash       Review of Systems  1. Constitutional: Negative for fever. Negative for chills, diaphoresis, fatigue; unexpected weight gain as per above.  Excessive daytime drowsiness.  2. HENT: No dysphagia; no changes to vision/hearing/smell/taste; no epistaxis  3. Eyes: Negative for redness and visual disturbance.   4. Respiratory: negative for shortness of breath. Negative for chest pain . Negative for cough and chest tightness.   5. Cardiovascular: Negative for chest pain and palpitations.  "  6. Gastrointestinal: Negative for abdominal distention, abdominal pain and blood in stool.   7. Endocrine: Negative for cold intolerance and heat intolerance.   8. Genitourinary: Negative for difficulty urinating, dysuria and frequency.  Decreased libido as per above.  9. Musculoskeletal: Negative for arthralgias, back pain and myalgias.   10. Skin: Negative for color change, rash and wound.   11. Neurological: Negative for syncope, weakness and headaches.   12. Hematological: Negative for adenopathy. Does not bruise/bleed easily.   13. Psychiatric/Behavioral: Negative for confusion. The patient is not nervous/anxious.    Objective     Physical Exam   Vital Signs: /70   Pulse 103   Temp 98 °F (36.7 °C)   Resp 16   Ht 157.5 cm (62\")   Wt (!) 139 kg (307 lb 3.2 oz)   SpO2 98%   Breastfeeding No   BMI 56.19 kg/m²     General Appearance: alert, oriented x 3, no acute distress.  Skin: warm and dry.   HEENT: Atraumatic.  pupils round and reactive to light and accommodation, oral mucosa pink and moist.  Nares patent without epistaxis.  External auditory canals are patent tympanic membranes intact.  Neck: supple, no JVD, trachea midline.  No thyromegaly  Lungs: CTA, unlabored breathing effort.  Heart: RRR, normal S1 and S2, no S3, no rub.  Abdomen: soft, non-tender, no palpable bladder, present bowel sounds to auscultation ×4.  No guarding or rigidity.  Extremities: no clubbing, cyanosis or edema.  Good range of motion actively and passively.  Symmetric muscle strength and development  Neuro: normal speech and mental status.  Cranial nerves II through XII intact.  No anosmia. DTR 2+; proprioception intact.  No focal motor/sensory deficits.       Assessment and Plan      Assessment/Plan:   Diagnoses and all orders for this visit:    1. Depression with anxiety (Primary)    2. Gastroesophageal reflux disease without esophagitis  -     lansoprazole (PREVACID) 30 MG capsule; Take 1 capsule by mouth Daily.  " Dispense: 90 capsule; Refill: 2    3. Mild persistent asthma without complication    4. Binge eating  -     amphetamine-dextroamphetamine (Adderall) 10 MG tablet; Take 1 tablet by mouth 2 (Two) Times a Day.  Dispense: 60 tablet; Refill: 0    5. Class 3 severe obesity due to excess calories with serious comorbidity and body mass index (BMI) of 50.0 to 59.9 in adult (HCC)  -     amphetamine-dextroamphetamine (Adderall) 10 MG tablet; Take 1 tablet by mouth 2 (Two) Times a Day.  Dispense: 60 tablet; Refill: 0    6. Has daytime drowsiness  -     amphetamine-dextroamphetamine (Adderall) 10 MG tablet; Take 1 tablet by mouth 2 (Two) Times a Day.  Dispense: 60 tablet; Refill: 0    7. Acute seasonal allergic rhinitis  -     loratadine (CLARITIN) 10 MG tablet; Take 1 tablet by mouth Daily.  Dispense: 90 tablet; Refill: 2    Continue current mood stabilizer treatment regimen.    Discussed need for stress/anxiety reducing techniques such as prayer/meditation/breathing and counting exercises and avoidance of stress producing environments/situations; will follow clinically.    Continue lansoprazole use, as well as dietary/modifications to aid in symptom management of her chronic GERD.    I recommended trial of Adderall.  She will start with once daily dosing, and if tolerated, can increase to twice daily dosing.  Advised by the office should she develop any ill effects patient (will improve her binge eating, as well as her excessive daytime drowsiness.  Patient's insurance denied coverage for Vyvanse.    Vital signs demonstrate hemodynamic stability.    Discussed need for reduction in sodium/salt/caffeine intake; improve sleep habits as able; inc formal CV exercise program with goal of vigorous activity most, if not all, days of the week; goal of 250 min of sustained HR CV exercise total per week.          Discussion Summary:    Discussed plan of care in detail with pt today; pt verb understanding and agrees.    I spent 30 minutes  caring for Carolyn on this date of service. This time includes time spent by me in the following activities:preparing for the visit, performing a medically appropriate examination and/or evaluation , counseling and educating the patient/family/caregiver, ordering medications, tests, or procedures, documenting information in the medical record and care coordination    I have reviewed and updated all copied forward information, as appropriate.  I attest to the accuracy and relevance of any unchanged information.    Follow up:  Return 3-4 weeks, for Recheck, Med Change/New Meds.     There are no Patient Instructions on file for this visit.    Clement Velasco DO  07/15/22  18:13 EDT          Please note that portions of this note may have been completed with a voice recognition program. Efforts were made to edit the dictations, but occasionally words are mistranscribed.    Transcribed from ambient dictation for Clement Velasco DO by Abelardo Jha.  07/15/22   10:52 EDT    Patient verbalized consent to the visit recording.  I have personally performed the services described in this document as transcribed by the above individual, and it is both accurate and complete.  Clement Velasco DO  7/15/2022  18:13 EDT

## 2022-07-18 ENCOUNTER — PRIOR AUTHORIZATION (OUTPATIENT)
Dept: FAMILY MEDICINE CLINIC | Facility: CLINIC | Age: 32
End: 2022-07-18

## 2022-07-18 NOTE — TELEPHONE ENCOUNTER
A PRIOR AUTH HAS BEEN STARTED THROUGH [facility] [facility] FOR ADDERALL 10 MG.    WAITING ON A RESPONSE FROM THE INSURANCE.    KEY:[id number]

## 2022-07-22 ENCOUNTER — PRIOR AUTHORIZATION (OUTPATIENT)
Dept: FAMILY MEDICINE CLINIC | Facility: CLINIC | Age: 32
End: 2022-07-22

## 2022-07-22 NOTE — TELEPHONE ENCOUNTER
A PRIOR AUTH HAS BEEN STARTED THROUGH [facility] [facility] FOR UBRELVY.    WAITING ON A RESPONSE FROM THE INSURANCE.    Key: [id number]

## 2022-07-27 ENCOUNTER — PRIOR AUTHORIZATION (OUTPATIENT)
Dept: FAMILY MEDICINE CLINIC | Facility: CLINIC | Age: 32
End: 2022-07-27

## 2022-08-03 DIAGNOSIS — B37.9 YEAST INFECTION: Primary | ICD-10-CM

## 2022-08-03 RX ORDER — FLUCONAZOLE 150 MG/1
TABLET ORAL
Qty: 2 TABLET | Refills: 0 | Status: SHIPPED | OUTPATIENT
Start: 2022-08-03 | End: 2022-11-14

## 2022-08-19 DIAGNOSIS — F41.8 DEPRESSION WITH ANXIETY: Primary | ICD-10-CM

## 2022-08-19 DIAGNOSIS — R63.2 BINGE EATING: ICD-10-CM

## 2022-09-12 ENCOUNTER — OFFICE VISIT (OUTPATIENT)
Dept: FAMILY MEDICINE CLINIC | Facility: CLINIC | Age: 32
End: 2022-09-12

## 2022-09-12 VITALS
BODY MASS INDEX: 53.92 KG/M2 | HEIGHT: 62 IN | RESPIRATION RATE: 16 BRPM | TEMPERATURE: 97.8 F | DIASTOLIC BLOOD PRESSURE: 86 MMHG | SYSTOLIC BLOOD PRESSURE: 132 MMHG | OXYGEN SATURATION: 98 % | HEART RATE: 111 BPM | WEIGHT: 293 LBS

## 2022-09-12 DIAGNOSIS — R63.2 BINGE EATING: Primary | Chronic | ICD-10-CM

## 2022-09-12 DIAGNOSIS — G47.30 SLEEP APNEA IN ADULT: Chronic | ICD-10-CM

## 2022-09-12 DIAGNOSIS — R40.0 HAS DAYTIME DROWSINESS: ICD-10-CM

## 2022-09-12 DIAGNOSIS — F41.8 DEPRESSION WITH ANXIETY: Chronic | ICD-10-CM

## 2022-09-12 DIAGNOSIS — E66.01 CLASS 3 SEVERE OBESITY DUE TO EXCESS CALORIES WITH SERIOUS COMORBIDITY AND BODY MASS INDEX (BMI) OF 50.0 TO 59.9 IN ADULT: ICD-10-CM

## 2022-09-12 PROCEDURE — 99214 OFFICE O/P EST MOD 30 MIN: CPT | Performed by: FAMILY MEDICINE

## 2022-09-12 RX ORDER — ESCITALOPRAM OXALATE 10 MG/1
10 TABLET ORAL DAILY
Qty: 90 TABLET | Refills: 1 | Status: SHIPPED | OUTPATIENT
Start: 2022-09-12 | End: 2022-11-14

## 2022-09-12 RX ORDER — DEXTROAMPHETAMINE SACCHARATE, AMPHETAMINE ASPARTATE, DEXTROAMPHETAMINE SULFATE AND AMPHETAMINE SULFATE 2.5; 2.5; 2.5; 2.5 MG/1; MG/1; MG/1; MG/1
20 TABLET ORAL 2 TIMES DAILY
Qty: 60 TABLET | Refills: 0 | Status: SHIPPED | OUTPATIENT
Start: 2022-09-12 | End: 2022-10-03 | Stop reason: SDUPTHER

## 2022-09-13 ENCOUNTER — TELEPHONE (OUTPATIENT)
Dept: FAMILY MEDICINE CLINIC | Facility: CLINIC | Age: 32
End: 2022-09-13

## 2022-09-13 NOTE — TELEPHONE ENCOUNTER
Caller: Carolyn Theodore    Relationship: Self    Best call back number: [phone]     What form or medical record are you requesting: PRIOR AUTHORIZATION ON ADDERALL CHANGE    Who is requesting this form or medical record from you: INSURANCE COMPANY    How would you like to receive the form or medical records (pick-up, mail, fax):   If fax, what is the fax number:  If mail, what is the address:   If pick-up, provide patient with address and location details    Timeframe paperwork needed: ASAP    Additional notes:

## 2022-09-14 ENCOUNTER — PRIOR AUTHORIZATION (OUTPATIENT)
Dept: FAMILY MEDICINE CLINIC | Facility: CLINIC | Age: 32
End: 2022-09-14

## 2022-09-14 NOTE — TELEPHONE ENCOUNTER
A PRIOR AUTH HAS BEEN STARTED THROUGH [facility] [facility] FOR ADDERALL.    WAITING ON A RESPONSE FROM THE INSURANCE.    Key: [id number]

## 2022-09-14 NOTE — TELEPHONE ENCOUNTER
A PRIOR AUTH HAS BEEN STARTED THROUGH [facility] FOR THE UPDATED DOSING OF ADDERALL.    WAITING ON A RESPONSE FROM THE INSURANCE.    Key: [id number]

## 2022-10-03 DIAGNOSIS — E66.01 CLASS 3 SEVERE OBESITY DUE TO EXCESS CALORIES WITH SERIOUS COMORBIDITY AND BODY MASS INDEX (BMI) OF 50.0 TO 59.9 IN ADULT: ICD-10-CM

## 2022-10-03 DIAGNOSIS — R40.0 HAS DAYTIME DROWSINESS: ICD-10-CM

## 2022-10-03 DIAGNOSIS — R63.2 BINGE EATING: Chronic | ICD-10-CM

## 2022-10-03 RX ORDER — DEXTROAMPHETAMINE SACCHARATE, AMPHETAMINE ASPARTATE, DEXTROAMPHETAMINE SULFATE AND AMPHETAMINE SULFATE 2.5; 2.5; 2.5; 2.5 MG/1; MG/1; MG/1; MG/1
20 TABLET ORAL 2 TIMES DAILY
Qty: 60 TABLET | Refills: 0 | Status: SHIPPED | OUTPATIENT
Start: 2022-10-03 | End: 2022-10-25 | Stop reason: SDUPTHER

## 2022-10-03 NOTE — TELEPHONE ENCOUNTER
Rx Refill Note  Requested Prescriptions     Pending Prescriptions Disp Refills   • amphetamine-dextroamphetamine (Adderall) 10 MG tablet 60 tablet 0     Sig: Take 2 tablets by mouth 2 (Two) Times a Day.      Last office visit with prescribing clinician: 9/12/2022      Next office visit with prescribing clinician: Visit date not found            Deborah Reeves MA  10/03/22, 16:03 EDT

## 2022-10-21 DIAGNOSIS — R40.0 HAS DAYTIME DROWSINESS: ICD-10-CM

## 2022-10-21 DIAGNOSIS — R63.2 BINGE EATING: Chronic | ICD-10-CM

## 2022-10-21 DIAGNOSIS — E66.01 CLASS 3 SEVERE OBESITY DUE TO EXCESS CALORIES WITH SERIOUS COMORBIDITY AND BODY MASS INDEX (BMI) OF 50.0 TO 59.9 IN ADULT: ICD-10-CM

## 2022-10-21 RX ORDER — DEXTROAMPHETAMINE SACCHARATE, AMPHETAMINE ASPARTATE, DEXTROAMPHETAMINE SULFATE AND AMPHETAMINE SULFATE 2.5; 2.5; 2.5; 2.5 MG/1; MG/1; MG/1; MG/1
20 TABLET ORAL 2 TIMES DAILY
Qty: 60 TABLET | Refills: 0 | Status: CANCELLED | OUTPATIENT
Start: 2022-10-21

## 2022-10-24 ENCOUNTER — TELEPHONE (OUTPATIENT)
Dept: FAMILY MEDICINE CLINIC | Facility: CLINIC | Age: 32
End: 2022-10-24

## 2022-10-24 DIAGNOSIS — R40.0 HAS DAYTIME DROWSINESS: ICD-10-CM

## 2022-10-24 DIAGNOSIS — R63.2 BINGE EATING: Chronic | ICD-10-CM

## 2022-10-24 DIAGNOSIS — E66.01 CLASS 3 SEVERE OBESITY DUE TO EXCESS CALORIES WITH SERIOUS COMORBIDITY AND BODY MASS INDEX (BMI) OF 50.0 TO 59.9 IN ADULT: ICD-10-CM

## 2022-10-24 RX ORDER — DEXTROAMPHETAMINE SACCHARATE, AMPHETAMINE ASPARTATE, DEXTROAMPHETAMINE SULFATE AND AMPHETAMINE SULFATE 2.5; 2.5; 2.5; 2.5 MG/1; MG/1; MG/1; MG/1
20 TABLET ORAL 2 TIMES DAILY
Qty: 60 TABLET | Refills: 0 | Status: CANCELLED | OUTPATIENT
Start: 2022-10-24

## 2022-10-24 NOTE — TELEPHONE ENCOUNTER
Caller: Carolyn Theodore    Relationship: Self    Best call back number: [phone]    What is the best time to reach you: ANYTIME  OK TO LEAVE VOICEMAIL.    Who are you requesting to speak with (clinical staff, provider,  specific staff member): CLINICAL STAFF    What was the call regarding: CALLING TO CHECK ON THE STATUS OF THE REFILL.    Do you require a callback: YES IF NEEDED. PATIENT IS REQUESTING TO BE UPDATED WHEN THE MEDICATION IS SENT OVER.

## 2022-10-25 DIAGNOSIS — E66.01 CLASS 3 SEVERE OBESITY DUE TO EXCESS CALORIES WITH SERIOUS COMORBIDITY AND BODY MASS INDEX (BMI) OF 50.0 TO 59.9 IN ADULT: ICD-10-CM

## 2022-10-25 DIAGNOSIS — R40.0 HAS DAYTIME DROWSINESS: ICD-10-CM

## 2022-10-25 DIAGNOSIS — R63.2 BINGE EATING: Chronic | ICD-10-CM

## 2022-10-25 RX ORDER — DEXTROAMPHETAMINE SACCHARATE, AMPHETAMINE ASPARTATE, DEXTROAMPHETAMINE SULFATE AND AMPHETAMINE SULFATE 2.5; 2.5; 2.5; 2.5 MG/1; MG/1; MG/1; MG/1
20 TABLET ORAL 2 TIMES DAILY
Qty: 60 TABLET | Refills: 0 | Status: SHIPPED | OUTPATIENT
Start: 2022-10-25 | End: 2022-11-14 | Stop reason: CLARIF

## 2022-10-25 NOTE — TELEPHONE ENCOUNTER
Rx Refill Note  Requested Prescriptions     Pending Prescriptions Disp Refills   • amphetamine-dextroamphetamine (Adderall) 10 MG tablet 60 tablet 0     Sig: Take 2 tablets by mouth 2 (Two) Times a Day.      Last office visit with prescribing clinician: 9/12/2022      Next office visit with prescribing clinician: Visit date not found            Kavitha Aponte MA  10/25/22, 08:37 EDT

## 2022-10-25 NOTE — TELEPHONE ENCOUNTER
PT CALLED TO CHECK STATUS FOR RX ADDERALL, PT STATED THAT SHE IS OUT OF RX AND NEEDS REFILL ASAP.    [facility] [address] [place] [address], [place] [address] [facility] [phone] [facility] [phone] FX

## 2022-11-09 DIAGNOSIS — J30.2 ACUTE SEASONAL ALLERGIC RHINITIS: ICD-10-CM

## 2022-11-09 DIAGNOSIS — R40.0 HAS DAYTIME DROWSINESS: ICD-10-CM

## 2022-11-09 DIAGNOSIS — E66.01 CLASS 3 SEVERE OBESITY DUE TO EXCESS CALORIES WITH SERIOUS COMORBIDITY AND BODY MASS INDEX (BMI) OF 50.0 TO 59.9 IN ADULT: ICD-10-CM

## 2022-11-09 DIAGNOSIS — R63.2 BINGE EATING: Chronic | ICD-10-CM

## 2022-11-09 RX ORDER — DEXTROAMPHETAMINE SACCHARATE, AMPHETAMINE ASPARTATE, DEXTROAMPHETAMINE SULFATE AND AMPHETAMINE SULFATE 2.5; 2.5; 2.5; 2.5 MG/1; MG/1; MG/1; MG/1
20 TABLET ORAL 2 TIMES DAILY
Qty: 60 TABLET | Refills: 0 | OUTPATIENT
Start: 2022-11-09

## 2022-11-09 RX ORDER — LORATADINE 10 MG/1
10 TABLET ORAL DAILY
Qty: 90 TABLET | Refills: 2 | Status: SHIPPED | OUTPATIENT
Start: 2022-11-09

## 2022-11-14 ENCOUNTER — OFFICE VISIT (OUTPATIENT)
Dept: FAMILY MEDICINE CLINIC | Facility: CLINIC | Age: 32
End: 2022-11-14

## 2022-11-14 VITALS
BODY MASS INDEX: 53.92 KG/M2 | WEIGHT: 293 LBS | SYSTOLIC BLOOD PRESSURE: 130 MMHG | HEIGHT: 62 IN | HEART RATE: 115 BPM | DIASTOLIC BLOOD PRESSURE: 90 MMHG | OXYGEN SATURATION: 98 % | TEMPERATURE: 97.9 F

## 2022-11-14 DIAGNOSIS — R40.0 HAS DAYTIME DROWSINESS: ICD-10-CM

## 2022-11-14 DIAGNOSIS — E66.01 CLASS 3 SEVERE OBESITY DUE TO EXCESS CALORIES WITH SERIOUS COMORBIDITY AND BODY MASS INDEX (BMI) OF 50.0 TO 59.9 IN ADULT: ICD-10-CM

## 2022-11-14 DIAGNOSIS — F41.8 DEPRESSION WITH ANXIETY: Chronic | ICD-10-CM

## 2022-11-14 DIAGNOSIS — R63.2 BINGE EATING: Chronic | ICD-10-CM

## 2022-11-14 PROCEDURE — 99214 OFFICE O/P EST MOD 30 MIN: CPT | Performed by: NURSE PRACTITIONER

## 2022-11-14 RX ORDER — SEMAGLUTIDE 1.34 MG/ML
0.25 INJECTION, SOLUTION SUBCUTANEOUS WEEKLY
Qty: 1.5 ML | Refills: 0 | Status: SHIPPED | OUTPATIENT
Start: 2022-11-14

## 2022-11-14 RX ORDER — ESCITALOPRAM OXALATE 10 MG/1
15 TABLET ORAL DAILY
Qty: 135 TABLET | Refills: 1 | Status: SHIPPED | OUTPATIENT
Start: 2022-11-14 | End: 2023-02-14

## 2022-11-14 RX ORDER — DEXTROAMPHETAMINE SACCHARATE, AMPHETAMINE ASPARTATE, DEXTROAMPHETAMINE SULFATE AND AMPHETAMINE SULFATE 5; 5; 5; 5 MG/1; MG/1; MG/1; MG/1
20 TABLET ORAL 2 TIMES DAILY
Qty: 60 TABLET | Refills: 0 | Status: SHIPPED | OUTPATIENT
Start: 2022-11-14 | End: 2022-12-19 | Stop reason: SDUPTHER

## 2022-11-14 RX ORDER — DEXTROAMPHETAMINE SACCHARATE, AMPHETAMINE ASPARTATE, DEXTROAMPHETAMINE SULFATE AND AMPHETAMINE SULFATE 2.5; 2.5; 2.5; 2.5 MG/1; MG/1; MG/1; MG/1
20 TABLET ORAL 2 TIMES DAILY
Qty: 60 TABLET | Refills: 0 | Status: CANCELLED | OUTPATIENT
Start: 2022-11-14

## 2022-11-14 NOTE — PROGRESS NOTES
Subjective     Chief Complaint:    Chief Complaint   Patient presents with   • Obesity       History of Present Illness:   Here for f/u.   She is adderall for excessive daytime sleepiness. It has also curved her appetite. She is no longer binge eating  BP elevated today but she feels that is due to the anbx vs ear infection she currently has   Depression/anxiety on lexapro, would like to increase      Review of Systems  Gen- No fevers, chills  CV- No chest pain, palpitations  Resp- No cough, dyspnea  GI- No N/V/D, abd pain  Neuro-No dizziness, headaches      I have reviewed and/or updated the patient's past medical, surgical, family, social history and problem list as appropriate.     Medications:    Current Outpatient Medications:   •  albuterol (PROVENTIL) (2.5 MG/3ML) 0.083% nebulizer solution, Take 2.5 mg by nebulization Every 4 (Four) Hours As Needed for Wheezing., Disp: 180 mL, Rfl: 0  •  amphetamine-dextroamphetamine (Adderall) 10 MG tablet, Take 2 tablets by mouth 2 (Two) Times a Day., Disp: 60 tablet, Rfl: 0  •  Ascorbic Acid (Vitamin C) 500 MG capsule, Take  by mouth., Disp: , Rfl:   •  Biotin 5 MG capsule, Take  by mouth., Disp: , Rfl:   •  escitalopram (LEXAPRO) 10 MG tablet, Take 1.5 tablets by mouth Daily., Disp: 135 tablet, Rfl: 1  •  lansoprazole (PREVACID) 30 MG capsule, Take 1 capsule by mouth Daily., Disp: 90 capsule, Rfl: 2  •  loratadine (CLARITIN) 10 MG tablet, Take 1 tablet by mouth Daily., Disp: 90 tablet, Rfl: 2  •  multivitamin with minerals tablet tablet, Take 1 tablet by mouth Daily., Disp: , Rfl:   •  vitamin D3 125 MCG (5000 UT) capsule capsule, Take 5,000 Units by mouth Daily., Disp: , Rfl:   •  Semaglutide,0.25 or 0.5MG/DOS, (Ozempic, 0.25 or 0.5 MG/DOSE,) 2 MG/1.5ML solution pen-injector, Inject 0.25 mg under the skin into the appropriate area as directed 1 (One) Time Per Week., Disp: 1.5 mL, Rfl: 0    Allergies:  Allergies   Allergen Reactions   • Doxycycline Nausea Only   •  "Nsaids    • Amoxicillin Rash   • Penicillins Rash       Objective     Vital Signs:   Vitals:    11/14/22 1548 11/14/22 1613   BP: (!) 155/105 130/90   Pulse: 115    Temp: 97.9 °F (36.6 °C)    SpO2: 98%    Weight: 136 kg (299 lb 6.4 oz)    Height: 157.5 cm (62.01\")    PainSc: 0-No pain      Body mass index is 54.75 kg/m².    Physical Exam:    Physical Exam  Vitals and nursing note reviewed.   Constitutional:       Appearance: She is well-developed. She is obese.   HENT:      Head: Normocephalic and atraumatic.   Eyes:      Pupils: Pupils are equal, round, and reactive to light.   Cardiovascular:      Rate and Rhythm: Normal rate and regular rhythm.      Heart sounds: Normal heart sounds.   Pulmonary:      Effort: Pulmonary effort is normal.      Breath sounds: Normal breath sounds.   Abdominal:      General: Bowel sounds are normal. There is no distension.      Palpations: Abdomen is soft.      Tenderness: There is no abdominal tenderness.   Musculoskeletal:      Cervical back: Neck supple.   Skin:     General: Skin is warm and dry.      Capillary Refill: Capillary refill takes less than 2 seconds.   Neurological:      General: No focal deficit present.      Mental Status: She is alert and oriented to person, place, and time.   Psychiatric:         Mood and Affect: Mood normal.         Behavior: Behavior normal.         Assessment / Plan     Assessment/Plan:   Problem List Items Addressed This Visit        Endocrine and Metabolic    Class 3 severe obesity due to excess calories with serious comorbidity and body mass index (BMI) of 50.0 to 59.9 in adult (HCC)    Relevant Medications    amphetamine-dextroamphetamine (Adderall) 10 MG tablet    Semaglutide,0.25 or 0.5MG/DOS, (Ozempic, 0.25 or 0.5 MG/DOSE,) 2 MG/1.5ML solution pen-injector    Other Relevant Orders    Comprehensive Metabolic Panel    CBC Auto Differential    Hemoglobin A1c       Mental Health    Depression with anxiety (Chronic)    Relevant Medications    " amphetamine-dextroamphetamine (Adderall) 10 MG tablet    escitalopram (LEXAPRO) 10 MG tablet       Neuro    Has daytime drowsiness    Relevant Medications    amphetamine-dextroamphetamine (Adderall) 10 MG tablet       Symptoms and Signs    Binge eating (Chronic)    Relevant Medications    amphetamine-dextroamphetamine (Adderall) 10 MG tablet     -- continue adderall per Dr. Velasco  -- will try to send ozempic  -- labs as above  -- bump up lexapro    Follow up:  3 months    Electronically signed by JEAN Rob   11/14/2022 16:00 EST      Please note that portions of this note were completed with a voice recognition program.

## 2022-11-15 ENCOUNTER — PRIOR AUTHORIZATION (OUTPATIENT)
Dept: FAMILY MEDICINE CLINIC | Facility: CLINIC | Age: 32
End: 2022-11-15

## 2022-11-15 LAB
ALBUMIN SERPL-MCNC: 4.3 G/DL (ref 3.8–4.8)
ALBUMIN/GLOB SERPL: 1.5 {RATIO} (ref 1.2–2.2)
ALP SERPL-CCNC: 113 IU/L (ref 44–121)
ALT SERPL-CCNC: 18 IU/L (ref 0–32)
AST SERPL-CCNC: 18 IU/L (ref 0–40)
BASOPHILS # BLD AUTO: 0.1 X10E3/UL (ref 0–0.2)
BASOPHILS NFR BLD AUTO: 1 %
BILIRUB SERPL-MCNC: 0.7 MG/DL (ref 0–1.2)
BUN SERPL-MCNC: 8 MG/DL (ref 6–20)
BUN/CREAT SERPL: 11 (ref 9–23)
CALCIUM SERPL-MCNC: 9.2 MG/DL (ref 8.7–10.2)
CHLORIDE SERPL-SCNC: 104 MMOL/L (ref 96–106)
CO2 SERPL-SCNC: 23 MMOL/L (ref 20–29)
CREAT SERPL-MCNC: 0.76 MG/DL (ref 0.57–1)
EGFRCR SERPLBLD CKD-EPI 2021: 105 ML/MIN/1.73
EOSINOPHIL # BLD AUTO: 0 X10E3/UL (ref 0–0.4)
EOSINOPHIL NFR BLD AUTO: 0 %
ERYTHROCYTE [DISTWIDTH] IN BLOOD BY AUTOMATED COUNT: 13.8 % (ref 11.7–15.4)
GLOBULIN SER CALC-MCNC: 2.9 G/DL (ref 1.5–4.5)
GLUCOSE SERPL-MCNC: 136 MG/DL (ref 70–99)
HBA1C MFR BLD: 5.5 % (ref 4.8–5.6)
HCT VFR BLD AUTO: 42.7 % (ref 34–46.6)
HGB BLD-MCNC: 13.6 G/DL (ref 11.1–15.9)
IMM GRANULOCYTES # BLD AUTO: 0.1 X10E3/UL (ref 0–0.1)
IMM GRANULOCYTES NFR BLD AUTO: 1 %
LYMPHOCYTES # BLD AUTO: 2.6 X10E3/UL (ref 0.7–3.1)
LYMPHOCYTES NFR BLD AUTO: 21 %
MCH RBC QN AUTO: 25.8 PG (ref 26.6–33)
MCHC RBC AUTO-ENTMCNC: 31.9 G/DL (ref 31.5–35.7)
MCV RBC AUTO: 81 FL (ref 79–97)
MONOCYTES # BLD AUTO: 0.6 X10E3/UL (ref 0.1–0.9)
MONOCYTES NFR BLD AUTO: 5 %
NEUTROPHILS # BLD AUTO: 9.4 X10E3/UL (ref 1.4–7)
NEUTROPHILS NFR BLD AUTO: 72 %
PLATELET # BLD AUTO: 313 X10E3/UL (ref 150–450)
POTASSIUM SERPL-SCNC: 4.1 MMOL/L (ref 3.5–5.2)
PROT SERPL-MCNC: 7.2 G/DL (ref 6–8.5)
RBC # BLD AUTO: 5.28 X10E6/UL (ref 3.77–5.28)
SODIUM SERPL-SCNC: 141 MMOL/L (ref 134–144)
WBC # BLD AUTO: 12.8 X10E3/UL (ref 3.4–10.8)

## 2022-11-15 NOTE — TELEPHONE ENCOUNTER
A PRIOR AUTH HAS BEEN STARTED THROUGH COVER MY MEDS FOR OZEMPIC.    WAITING ON A RESPONSE FROM THE INSURANCE.    Key: [id number]

## 2022-11-16 NOTE — PROGRESS NOTES
Labs show elevated glucose but your A1c was 5.5 which is normal.  You do have chronic leukocytosis.  Likely related to obesity.  If you would like to see hematology due to this chronic elevation I can arrange.

## 2022-11-22 NOTE — TELEPHONE ENCOUNTER
PATIENTS MEDICATION HAS BEEN DENIED BY THE INSURANCE.      PER INSURANCE:  OUR GUIDELINE NAMED GLP-1 [employer] PREFERRED DRUGS [place] MEDICAID (REVIEWED FOR OZEMPIC) REQUIRES THAT YOU ARE USING THE MEDICATION FOR THE MANAGEMENT OF TYPE 2 DIABETES MELLITUS ( A CHRONIC CONDITION IN WHICH YOUR BODY IS RESISTANT TO INSULIN, A HORMONE THAT REGULATES YOUR BLOOD SUGAR).

## 2022-12-19 RX ORDER — DEXTROAMPHETAMINE SACCHARATE, AMPHETAMINE ASPARTATE, DEXTROAMPHETAMINE SULFATE AND AMPHETAMINE SULFATE 5; 5; 5; 5 MG/1; MG/1; MG/1; MG/1
20 TABLET ORAL 2 TIMES DAILY
Qty: 60 TABLET | Refills: 0 | Status: SHIPPED | OUTPATIENT
Start: 2022-12-19 | End: 2023-02-06 | Stop reason: SDUPTHER

## 2022-12-19 NOTE — TELEPHONE ENCOUNTER
Rx Refill Note  Requested Prescriptions     Pending Prescriptions Disp Refills   • amphetamine-dextroamphetamine (Adderall) 20 MG tablet 60 tablet 0     Sig: Take 1 tablet by mouth 2 (Two) Times a Day.      Last office visit with prescribing clinician: 9/12/2022   Last telemedicine visit with prescribing clinician: 2/15/2023   Next office visit with prescribing clinician: Visit date not found                         Would you like a call back once the refill request has been completed: [] Yes [] No    If the office needs to give you a call back, can they leave a voicemail: [] Yes [] No    Kriss Boyle MA  12/19/22, 15:46 EST

## 2023-02-07 NOTE — TELEPHONE ENCOUNTER
Rx Refill Note  Requested Prescriptions     Pending Prescriptions Disp Refills   • amphetamine-dextroamphetamine (Adderall) 20 MG tablet 60 tablet 0     Sig: Take 1 tablet by mouth 2 (Two) Times a Day.      Last office visit with prescribing clinician: 9/12/2022   Last telemedicine visit with prescribing clinician: 3/14/2023   Next office visit with prescribing clinician: 3/14/2023                         Would you like a call back once the refill request has been completed: [] Yes [] No    If the office needs to give you a call back, can they leave a voicemail: [] Yes [] No    Deborah Reeves MA  02/07/23, 10:10 EST

## 2023-02-08 RX ORDER — DEXTROAMPHETAMINE SACCHARATE, AMPHETAMINE ASPARTATE, DEXTROAMPHETAMINE SULFATE AND AMPHETAMINE SULFATE 5; 5; 5; 5 MG/1; MG/1; MG/1; MG/1
20 TABLET ORAL 2 TIMES DAILY
Qty: 60 TABLET | Refills: 0 | Status: SHIPPED | OUTPATIENT
Start: 2023-02-08 | End: 2023-03-14 | Stop reason: ALTCHOICE

## 2023-02-14 DIAGNOSIS — F41.8 DEPRESSION WITH ANXIETY: Chronic | ICD-10-CM

## 2023-02-14 RX ORDER — ESCITALOPRAM OXALATE 20 MG/1
20 TABLET ORAL DAILY
Qty: 90 TABLET | Refills: 3 | Status: SHIPPED | OUTPATIENT
Start: 2023-02-14 | End: 2023-03-14 | Stop reason: SDUPTHER

## 2023-03-14 ENCOUNTER — OFFICE VISIT (OUTPATIENT)
Dept: FAMILY MEDICINE CLINIC | Facility: CLINIC | Age: 33
End: 2023-03-14
Payer: MEDICAID

## 2023-03-14 VITALS
TEMPERATURE: 97.3 F | BODY MASS INDEX: 53.92 KG/M2 | OXYGEN SATURATION: 100 % | DIASTOLIC BLOOD PRESSURE: 108 MMHG | RESPIRATION RATE: 14 BRPM | HEART RATE: 77 BPM | WEIGHT: 293 LBS | SYSTOLIC BLOOD PRESSURE: 164 MMHG | HEIGHT: 62 IN

## 2023-03-14 DIAGNOSIS — G47.419 NARCOLEPSY WITHOUT CATAPLEXY: Primary | ICD-10-CM

## 2023-03-14 DIAGNOSIS — K21.9 GASTROESOPHAGEAL REFLUX DISEASE WITHOUT ESOPHAGITIS: ICD-10-CM

## 2023-03-14 DIAGNOSIS — F41.8 DEPRESSION WITH ANXIETY: Chronic | ICD-10-CM

## 2023-03-14 PROBLEM — R40.0 HAS DAYTIME DROWSINESS: Status: RESOLVED | Noted: 2022-06-01 | Resolved: 2023-03-14

## 2023-03-14 PROCEDURE — 99214 OFFICE O/P EST MOD 30 MIN: CPT | Performed by: FAMILY MEDICINE

## 2023-03-14 PROCEDURE — 1160F RVW MEDS BY RX/DR IN RCRD: CPT | Performed by: FAMILY MEDICINE

## 2023-03-14 PROCEDURE — 1159F MED LIST DOCD IN RCRD: CPT | Performed by: FAMILY MEDICINE

## 2023-03-14 RX ORDER — ESCITALOPRAM OXALATE 10 MG/1
10 TABLET ORAL DAILY
Qty: 90 TABLET | Refills: 1 | Status: SHIPPED | OUTPATIENT
Start: 2023-03-14

## 2023-03-14 RX ORDER — METHYLPHENIDATE HYDROCHLORIDE 5 MG/1
5 TABLET ORAL 2 TIMES DAILY
Qty: 60 TABLET | Refills: 0 | Status: SHIPPED | OUTPATIENT
Start: 2023-03-14

## 2023-03-14 NOTE — PROGRESS NOTES
Established Patient        Chief Complaint:   Chief Complaint   Patient presents with   • Depression   • binge eating        Carolyn Theodore is a 32 y.o. female    History of Present Illness:   Here today in follow-up of her mood disorder, GERD and excessive daytime drowsiness.    She denies any aspiration/dysphagia.  Denies any BRB/BTS.  Patient reports decreased effectiveness of current dosing Adderall.  She describes difficulty falling asleep, however upon awakening in the morning has excessive daytime drowsiness, falling asleep numerous times throughout the day despite her efforts not to.  She denies any epistaxis or hemoptysis.  Denies any dietary changes, although does states she has had continued difficulties with weight loss efforts.    She reports mood instability, a combination of situational stressors in her life, as well as failed response to dietary/lifestyle/exercise changes in an effort to achieve an overall healthier weight.  She denies any SI/HI.    Subjective     The following portions of the patient's history were reviewed and updated as appropriate: allergies, current medications, past family history, past medical history, past social history, past surgical history and problem list.    Allergies   Allergen Reactions   • Doxycycline Nausea Only   • Nsaids    • Amoxicillin Rash   • Penicillins Rash       Review of Systems  1. Constitutional: Negative for fever. Negative for chills, diaphoresis.  Excessive daytime drowsiness, as per above difficulty with weight loss efforts.  2. HENT: No dysphagia; no changes to vision/hearing/smell/taste; no epistaxis  3. Eyes: Negative for redness and visual disturbance.   4. Respiratory: negative for shortness of breath. Negative for chest pain . Negative for cough and chest tightness.   5. Cardiovascular: Negative for chest pain and palpitations.   6. Gastrointestinal: Negative for abdominal distention, abdominal pain and blood in stool.  "  7. Endocrine: Negative for cold intolerance and heat intolerance.   8. Genitourinary: Negative for difficulty urinating, dysuria and frequency.   9. Musculoskeletal: Negative for arthralgias, back pain and myalgias.   10. Skin: Negative for color change, rash and wound.   11. Neurological: Negative for syncope, weakness and headaches.   12. Hematological: Negative for adenopathy. Does not bruise/bleed easily.   13. Psychiatric/Behavioral: Negative for confusion.  As per above otherwise.    Objective     Physical Exam   Vital Signs: BP (!) 164/108   Pulse 77   Temp 97.3 °F (36.3 °C)   Resp 14   Ht 157.5 cm (62\")   Wt 133 kg (294 lb)   SpO2 100%   BMI 53.77 kg/m²   Class 3 Severe Obesity (BMI >=40). Obesity-related health conditions include the following: none. Obesity is improving with lifestyle modifications. BMI is is above average; BMI management plan is completed. We discussed low calorie, low carb based diet program, portion control, increasing exercise, joining a fitness center or start home based exercise program and Weight [employer] or other Commercial based weight reduction program.    General Appearance: alert, oriented x 3, no acute distress.  Well-nourished and developed female.  Pleasant and interactive during questioning/examination.  Skin: warm and dry.   HEENT: Atraumatic.  pupils round and reactive to light and accommodation, oral mucosa pink and moist.  Nares patent without epistaxis.  External auditory canals are patent tympanic membranes intact.  Neck: supple, no JVD, trachea midline.  No thyromegaly  Lungs: CTA, unlabored breathing effort.  Heart: RRR, normal S1 and S2, no S3, no rub.  Abdomen: soft, non-tender, no palpable bladder, present bowel sounds to auscultation ×4.  No guarding or rigidity.  Extremities: no clubbing, cyanosis or edema.  Good range of motion actively and passively.  Symmetric muscle strength and development  Neuro: normal speech and mental status.  Cranial nerves " II through XII intact.  No anosmia. DTR 2+; proprioception intact.  No focal motor/sensory deficits.    Advance Care Planning   ACP discussion was held with the patient during this visit. Patient does not have an advance directive, information provided.       Assessment and Plan      Assessment/Plan:   Diagnoses and all orders for this visit:    1. Narcolepsy without cataplexy (Primary)  -     methylphenidate (Ritalin) 5 MG tablet; Take 1 tablet by mouth 2 (Two) Times a Day.  Dispense: 60 tablet; Refill: 0    2. Depression with anxiety  -     escitalopram (LEXAPRO) 10 MG tablet; Take 1 tablet by mouth Daily.  Dispense: 90 tablet; Refill: 1    3. Gastroesophageal reflux disease without esophagitis      Stop adderall; start trial dosing of methylphenidate; will follow clinically, adj dose as therapeutically tolerated/needed at f/u appt.    Dec dosing of escitalopram to 10 mg; will follow clinically.    Discussed need for reduction in sodium/salt/caffeine intake; improve sleep habits as able; inc formal CV exercise program with goal of vigorous activity most, if not all, days of the week; goal of 250 min of sustained HR CV exercise total per week.    Discussed need for stress/anxiety reducing techniques such as prayer/meditation/breathing and counting exercises and avoidance of stress producing environments/situations; will follow clinically.    Continue PPI therapy, as well as dietary/lifestyle modifications to aid in symptom management of her chronic GERD.    Discussion Summary:    Discussed plan of care in detail with pt today; pt verb understanding and agrees.    I spent 30 minutes caring for Carolyn on this date of service. This time includes time spent by me in the following activities:preparing for the visit, performing a medically appropriate examination and/or evaluation , counseling and educating the patient/family/caregiver, ordering medications, tests, or procedures, documenting information in the medical  record and care coordination    I have reviewed and updated all copied forward information, as appropriate.  I attest to the accuracy and relevance of any unchanged information.    Follow up:  Return in about 4 weeks (around 4/11/2023) for Recheck, Med Change/New Meds.     There are no Patient Instructions on file for this visit.    Clement Velasco DO  03/15/23  10:03 EDT

## 2023-04-14 ENCOUNTER — OFFICE VISIT (OUTPATIENT)
Dept: FAMILY MEDICINE CLINIC | Facility: CLINIC | Age: 33
End: 2023-04-14
Payer: MEDICAID

## 2023-04-14 VITALS
TEMPERATURE: 97.9 F | WEIGHT: 293 LBS | SYSTOLIC BLOOD PRESSURE: 180 MMHG | OXYGEN SATURATION: 96 % | RESPIRATION RATE: 16 BRPM | DIASTOLIC BLOOD PRESSURE: 110 MMHG | BODY MASS INDEX: 53.92 KG/M2 | HEIGHT: 62 IN | HEART RATE: 94 BPM

## 2023-04-14 DIAGNOSIS — J06.9 ACUTE URI: ICD-10-CM

## 2023-04-14 DIAGNOSIS — H66.003 NON-RECURRENT ACUTE SUPPURATIVE OTITIS MEDIA OF BOTH EARS WITHOUT SPONTANEOUS RUPTURE OF TYMPANIC MEMBRANES: Primary | ICD-10-CM

## 2023-04-14 PROCEDURE — 99214 OFFICE O/P EST MOD 30 MIN: CPT | Performed by: NURSE PRACTITIONER

## 2023-04-14 RX ORDER — FLUCONAZOLE 150 MG/1
TABLET ORAL
Qty: 2 TABLET | Refills: 0 | Status: SHIPPED | OUTPATIENT
Start: 2023-04-14

## 2023-04-14 RX ORDER — PREDNISONE 10 MG/1
TABLET ORAL
Qty: 15 TABLET | Refills: 0 | Status: SHIPPED | OUTPATIENT
Start: 2023-04-14

## 2023-04-14 RX ORDER — AZITHROMYCIN 250 MG/1
TABLET, FILM COATED ORAL
Qty: 6 TABLET | Refills: 0 | Status: SHIPPED | OUTPATIENT
Start: 2023-04-14

## 2023-04-14 NOTE — PROGRESS NOTES
"                      Established Patient        Chief Complaint:   Chief Complaint   Patient presents with   • URI         History of Present Illness:    Carolyn Theodore is a 32 y.o. female who presents today for complaints of runny nose, productive cough with yellow/green sputum, sore throat, right ear pain. Onset of symptoms on 4/5/23. Patient reports that she was seen at  on 4/9/23 and was treated for right otitis media. Was prescribed Omnicef. Has been taking as prescribed but reports that symptoms continue to worsen. Returned to  on 4/12, was told to continue Omnicef and was given steroid injection. Reports that he felt better later that evening but symptoms returned/worsened the next day Patient states that throat is so sore that she has trouble swallowing. Reports chills but denies fever. Strep negative x 2, COVID negative x 2.     Subjective     The following portions of the patient's history were reviewed and updated as appropriate: allergies, current medications, past family history, past medical history, past social history, past surgical history and problem list.    ALLERGIES  Allergies   Allergen Reactions   • Doxycycline Nausea Only   • Nsaids    • Amoxicillin Rash   • Penicillins Rash       ROS  Review of Systems   Constitutional: Positive for chills. Negative for fatigue and fever.   HENT: Positive for ear pain, rhinorrhea and sore throat. Negative for congestion and ear discharge.    Respiratory: Positive for cough. Negative for shortness of breath.    Cardiovascular: Negative for chest pain and palpitations.   Gastrointestinal: Negative for diarrhea, nausea and vomiting.   Neurological: Positive for headaches. Negative for dizziness.       Objective     Vital Signs:   BP (!) 180/110   Pulse 94   Temp 97.9 °F (36.6 °C)   Resp 16   Ht 157.5 cm (62\")   Wt 134 kg (295 lb)   LMP 03/19/2023 (Approximate)   SpO2 96%   BMI 53.96 kg/m²     Class 3 Severe Obesity (BMI >=40). Obesity-related " health conditions include the following: GERD. Obesity is unchanged. BMI is is above average; BMI management plan is completed. We discussed follow up with PCP.       Physical Exam   Physical Exam  Vitals and nursing note reviewed.   Constitutional:       Appearance: She is obese.   HENT:      Right Ear: Swelling and tenderness present. A middle ear effusion is present. Tympanic membrane is erythematous and bulging.      Left Ear: Swelling and tenderness present. A middle ear effusion is present. Tympanic membrane is erythematous and bulging.      Nose: Mucosal edema present.      Mouth/Throat:      Pharynx: Pharyngeal swelling and posterior oropharyngeal erythema present.   Cardiovascular:      Rate and Rhythm: Normal rate and regular rhythm.      Heart sounds: Normal heart sounds.   Pulmonary:      Effort: Pulmonary effort is normal.      Breath sounds: Normal breath sounds.   Lymphadenopathy:      Head:      Right side of head: Preauricular and posterior auricular adenopathy present.      Left side of head: Preauricular and posterior auricular adenopathy present.      Cervical: Cervical adenopathy present.      Right cervical: Superficial cervical adenopathy present.      Left cervical: Superficial cervical adenopathy present.   Neurological:      Mental Status: She is alert and oriented to person, place, and time.   Psychiatric:         Mood and Affect: Mood normal.         Behavior: Behavior normal.       Assessment and Plan      Assessment/Plan:   Diagnoses and all orders for this visit:    1. Non-recurrent acute suppurative otitis media of both ears without spontaneous rupture of tympanic membranes (Primary)  -     predniSONE (DELTASONE) 10 MG tablet; Take 5 tablets today, decrease dose by 1 tablet each day until gone. 5,4,3,2,1  Dispense: 15 tablet; Refill: 0  -     azithromycin (Zithromax Z-Albert) 250 MG tablet; Take 2 tablets by mouth on day 1, then 1 tablet daily on days 2-5  Dispense: 6 tablet; Refill:  0    2. Acute URI  -     predniSONE (DELTASONE) 10 MG tablet; Take 5 tablets today, decrease dose by 1 tablet each day until gone. 5,4,3,2,1  Dispense: 15 tablet; Refill: 0    Other orders  -     fluconazole (Diflucan) 150 MG tablet; Take 1 tablet on day 1 of antibiotics and repeat in 3 days  Dispense: 2 tablet; Refill: 0        Discussion Summary:  Discussed plan of care in detail with pt today; pt verb understanding and agrees.      I spent 30 minutes caring for Carolyn on this date of service. This time includes time spent by me in the following activities:preparing for the visit, obtaining and/or reviewing a separately obtained history, performing a medically appropriate examination and/or evaluation , counseling and educating the patient/family/caregiver, ordering medications, tests, or procedures and documenting information in the medical record      I have reviewed and updated all copied forward information, as appropriate.  I attest to the accuracy and relevance of any unchanged information.      Follow up:  Return if symptoms worsen or fail to improve.     Patient Education:  There are no Patient Instructions on file for this visit.    Maira Abernathy, JEAN  04/14/23  15:55 EDT          Please note that portions of this note may have been completed with a voice recognition program.

## 2023-04-26 ENCOUNTER — TELEPHONE (OUTPATIENT)
Dept: FAMILY MEDICINE CLINIC | Facility: CLINIC | Age: 33
End: 2023-04-26
Payer: MEDICAID

## 2023-04-26 NOTE — TELEPHONE ENCOUNTER
PT CALLED STATING THAT HER EYES ARE HURTING AND EARS STILL HURTING. CAN SOMETHING BE CALLED IN?    PLEASE ADVISE

## 2023-08-22 ENCOUNTER — TELEPHONE (OUTPATIENT)
Dept: FAMILY MEDICINE CLINIC | Facility: CLINIC | Age: 33
End: 2023-08-22
Payer: MEDICAID

## 2023-08-23 RX ORDER — DEXAMETHASONE 0.5 MG/1
TABLET ORAL
Qty: 21 TABLET | Refills: 0 | Status: SHIPPED | OUTPATIENT
Start: 2023-08-23

## 2023-09-13 ENCOUNTER — OFFICE VISIT (OUTPATIENT)
Dept: FAMILY MEDICINE CLINIC | Facility: CLINIC | Age: 33
End: 2023-09-13

## 2023-09-13 VITALS
SYSTOLIC BLOOD PRESSURE: 146 MMHG | RESPIRATION RATE: 16 BRPM | DIASTOLIC BLOOD PRESSURE: 118 MMHG | HEIGHT: 62 IN | WEIGHT: 293 LBS | TEMPERATURE: 97.5 F | BODY MASS INDEX: 53.92 KG/M2 | OXYGEN SATURATION: 99 % | HEART RATE: 77 BPM

## 2023-09-13 DIAGNOSIS — R05.3 POST-COVID-19 SYNDROME MANIFESTING AS CHRONIC COUGH: ICD-10-CM

## 2023-09-13 DIAGNOSIS — R03.0 ELEVATED BP WITHOUT DIAGNOSIS OF HYPERTENSION: ICD-10-CM

## 2023-09-13 DIAGNOSIS — U09.9 POST-COVID-19 SYNDROME MANIFESTING AS CHRONIC COUGH: ICD-10-CM

## 2023-09-13 DIAGNOSIS — F41.8 DEPRESSION WITH ANXIETY: Primary | ICD-10-CM

## 2023-09-13 RX ORDER — DESVENLAFAXINE 25 MG/1
25 TABLET, EXTENDED RELEASE ORAL DAILY
Qty: 7 TABLET | Refills: 0 | Status: SHIPPED | OUTPATIENT
Start: 2023-09-13

## 2023-09-13 RX ORDER — DESVENLAFAXINE SUCCINATE 50 MG/1
50 TABLET, EXTENDED RELEASE ORAL DAILY
Qty: 30 TABLET | Refills: 2 | Status: SHIPPED | OUTPATIENT
Start: 2023-09-13

## 2023-09-13 NOTE — PROGRESS NOTES
"                      Established Patient        Chief Complaint:   Chief Complaint   Patient presents with    URI     Cough, congestion    Anxiety         History of Present Illness:    Carolyn Theodore is a 33 y.o. female who presents today for concerns of persistent productive cough, SOA. Reports sputum is thick, white, frothy.  Patient had COVID about 3-4 weeks ago. Also has some sinus pressure, bilateral ear pain--onset after COVID. Denies wheezing.     Patient missed several doses of Lexapro and did not notice much of a difference in her mood. Does not feel like lexapro is working anymore. Has tried other SSRIs in the past with same effects. Still has irritability even when taking Lexapro.     Subjective     The following portions of the patient's history were reviewed and updated as appropriate: allergies, current medications, past family history, past medical history, past social history, past surgical history and problem list.    ALLERGIES  Allergies   Allergen Reactions    Doxycycline Nausea Only    Nsaids     Amoxicillin Rash    Penicillins Rash       ROS  Review of Systems   Constitutional:  Negative for fatigue.   HENT:  Positive for ear pain and sinus pressure.    Respiratory:  Positive for cough and shortness of breath. Negative for chest tightness and wheezing.    Psychiatric/Behavioral:  Positive for agitation. Negative for dysphoric mood, self-injury and suicidal ideas. The patient is nervous/anxious.      Objective     Vital Signs:   BP (!) 146/118   Pulse 77   Temp 97.5 °F (36.4 °C)   Resp 16   Ht 157.5 cm (62\")   Wt (!) 143 kg (315 lb)   SpO2 99%   BMI 57.61 kg/m²     Physical Exam   Physical Exam  Vitals and nursing note reviewed.   Constitutional:       Appearance: She is obese.   HENT:      Right Ear: Tympanic membrane normal.      Left Ear: Tympanic membrane normal.      Nose: Congestion present.   Cardiovascular:      Rate and Rhythm: Normal rate and regular rhythm.   Pulmonary:      " Effort: Pulmonary effort is normal.      Breath sounds: Normal breath sounds.   Neurological:      Mental Status: She is alert and oriented to person, place, and time.   Psychiatric:         Mood and Affect: Mood normal.         Behavior: Behavior normal.       Assessment and Plan      Assessment/Plan:   Diagnoses and all orders for this visit:    1. Depression with anxiety (Primary)  -     Desvenlafaxine Succinate ER (Pristiq) 25 MG tablet sustained-release 24 hour; Take 1 tablet by mouth Daily.  Dispense: 7 tablet; Refill: 0  -     desvenlafaxine (Pristiq) 50 MG 24 hr tablet; Take 1 tablet by mouth Daily.  Dispense: 30 tablet; Refill: 2    2. Post-COVID-19 syndrome manifesting as chronic cough    3. Elevated BP without diagnosis of hypertension  Assessment & Plan:  Ambulatory blood pressure monitoring.   Decreased sodium intake.   Increase exercise regimen.  Weight loss.  Will reevaluate at next scheduled appointment.       Recommend Mucinex DM OTC for cough suppression. Discussed with patient that post-covid cough can linger but should not worsen. Patient to notify me of any acute changes in condition such as fever onset, change in sputum color, malaise, N/V/D.     Risks, benefits, and potential side effects of current/new medications reviewed with patient.  Patient voiced understanding and wished to proceed with Pristiq for treatment of anxiety and depression.    Patient was encouraged to keep me informed of any acute changes, lack of improvement, or any new concerning symptoms.    Patient voiced understanding of all instructions and denied further questions.  Agreeable to plan of care.       Discussion Summary:  Discussed plan of care in detail with pt today; pt verb understanding and agrees.      I spent 30 minutes caring for Carolyn on this date of service. This time includes time spent by me in the following activities:preparing for the visit, obtaining and/or reviewing a separately obtained history,  performing a medically appropriate examination and/or evaluation , counseling and educating the patient/family/caregiver, ordering medications, tests, or procedures, and documenting information in the medical record      I have reviewed and updated all copied forward information, as appropriate.  I attest to the accuracy and relevance of any unchanged information.      Follow up:  Return in about 6 weeks (around 10/25/2023).     Patient Education:  There are no Patient Instructions on file for this visit.    JEAN Roth  09/25/23  17:39 EDT          Please note that portions of this note may have been completed with a voice recognition program.

## 2023-09-15 RX ORDER — FLUCONAZOLE 150 MG/1
TABLET ORAL
Qty: 2 TABLET | Refills: 0 | Status: SHIPPED | OUTPATIENT
Start: 2023-09-15

## 2023-09-25 PROBLEM — R05.3 POST-COVID-19 SYNDROME MANIFESTING AS CHRONIC COUGH: Status: ACTIVE | Noted: 2023-09-25

## 2023-09-25 PROBLEM — U09.9 POST-COVID-19 SYNDROME MANIFESTING AS CHRONIC COUGH: Status: ACTIVE | Noted: 2023-09-25

## 2023-09-25 PROBLEM — R03.0 ELEVATED BP WITHOUT DIAGNOSIS OF HYPERTENSION: Status: ACTIVE | Noted: 2023-09-25

## 2023-09-25 NOTE — ASSESSMENT & PLAN NOTE
Ambulatory blood pressure monitoring.   Decreased sodium intake.   Increase exercise regimen.  Weight loss.  Will reevaluate at next scheduled appointment.

## 2023-10-01 DIAGNOSIS — K21.9 GASTROESOPHAGEAL REFLUX DISEASE WITHOUT ESOPHAGITIS: ICD-10-CM

## 2023-10-02 RX ORDER — LANSOPRAZOLE 30 MG/1
30 CAPSULE, DELAYED RELEASE ORAL DAILY
Qty: 90 CAPSULE | Refills: 0 | Status: SHIPPED | OUTPATIENT
Start: 2023-10-02

## 2023-10-27 RX ORDER — FLUCONAZOLE 150 MG/1
TABLET ORAL
Qty: 2 TABLET | Refills: 0 | Status: SHIPPED | OUTPATIENT
Start: 2023-10-27

## 2023-11-13 ENCOUNTER — OFFICE VISIT (OUTPATIENT)
Dept: FAMILY MEDICINE CLINIC | Facility: CLINIC | Age: 33
End: 2023-11-13
Payer: MEDICAID

## 2023-11-13 VITALS
OXYGEN SATURATION: 99 % | DIASTOLIC BLOOD PRESSURE: 78 MMHG | WEIGHT: 293 LBS | HEART RATE: 99 BPM | BODY MASS INDEX: 53.92 KG/M2 | SYSTOLIC BLOOD PRESSURE: 118 MMHG | RESPIRATION RATE: 16 BRPM | HEIGHT: 62 IN | TEMPERATURE: 98 F

## 2023-11-13 DIAGNOSIS — F41.8 DEPRESSION WITH ANXIETY: ICD-10-CM

## 2023-11-13 DIAGNOSIS — R63.2 BINGE EATING: Primary | Chronic | ICD-10-CM

## 2023-11-13 DIAGNOSIS — M72.2 BILATERAL PLANTAR FASCIITIS: ICD-10-CM

## 2023-11-13 DIAGNOSIS — R73.01 IMPAIRED FASTING GLUCOSE: ICD-10-CM

## 2023-11-13 PROCEDURE — 99214 OFFICE O/P EST MOD 30 MIN: CPT | Performed by: FAMILY MEDICINE

## 2023-11-13 RX ORDER — DESVENLAFAXINE 100 MG/1
100 TABLET, EXTENDED RELEASE ORAL DAILY
Qty: 90 TABLET | Refills: 1 | Status: SHIPPED | OUTPATIENT
Start: 2023-11-13 | End: 2023-11-29

## 2023-11-13 RX ORDER — LISDEXAMFETAMINE DIMESYLATE CAPSULES 20 MG/1
20 CAPSULE ORAL EVERY MORNING
Qty: 30 CAPSULE | Refills: 0 | Status: SHIPPED | OUTPATIENT
Start: 2023-11-13

## 2023-11-13 NOTE — PROGRESS NOTES
Established Patient        Chief Complaint:   Chief Complaint   Patient presents with    Follow-up    Med Refill    Pain     Right foot, was told she had bone degeneration     Shortness of Breath     With walking, pain in bottom of throat    binge eating    Obesity     Discuss weight loss options    Fatigue        Carolyn Theodore is a 33 y.o. female    History of Present Illness:   Here today in scheduled follow-up visit of her binge eating disorder, impaired fasting glucose and mood disorder.  Chronic history of bilateral plantar fasciitis.    Denies chest pain, syncope, palpitations or vertigo.  Denies fever, chills or night sweats.  Maintains an active lifestyle, balanced dietary intake; reports good hydration habits.  Denies hematuria/dysuria.  Denies any BRB/BTS.  No new rashes.  Denies orthopnea, epistaxis or hemoptysis.    Continues to deal with difficulty achieving desirable weight loss, has utilized numerous dietary/lifestyle/exercise modifications with varying levels of benefit, none sustainable.    Subjective     The following portions of the patient's history were reviewed and updated as appropriate: allergies, current medications, past family history, past medical history, past social history, past surgical history and problem list.    Allergies   Allergen Reactions    Doxycycline Nausea Only    Nsaids     Amoxicillin Rash    Penicillins Rash       Review of Systems  Constitutional: Negative for fever. Negative for chills, diaphoresis.  Excessive daytime drowsiness, as per above difficulty with weight loss efforts.  History of impaired fasting glucose.  HENT: No dysphagia; no changes to vision/hearing/smell/taste; no epistaxis  Eyes: Negative for redness and visual disturbance.   Respiratory: negative for shortness of breath. Negative for chest pain . Negative for cough and chest tightness.   Cardiovascular: Negative for chest pain and palpitations.   Gastrointestinal: Negative for  "abdominal distention, abdominal pain and blood in stool.   Endocrine: Negative for cold intolerance and heat intolerance.   Genitourinary: Negative for difficulty urinating, dysuria and frequency.   Musculoskeletal: Bilateral plantar foot pain.  Skin: Negative for color change, rash and wound.   Neurological: Negative for syncope, weakness and headaches.   Hematological: Negative for adenopathy. Does not bruise/bleed easily.   Psychiatric/Behavioral: Negative for confusion.  As per above otherwise.    Objective     Physical Exam   Vital Signs: /78   Pulse 99   Temp 98 °F (36.7 °C)   Resp 16   Ht 157.5 cm (62\")   Wt (!) 145 kg (320 lb)   SpO2 99%   BMI 58.53 kg/m²   Class 3 Severe Obesity (BMI >=40). Obesity-related health conditions include the following: none. Obesity is improving with lifestyle modifications. BMI is is above average; BMI management plan is completed. We discussed low calorie, low carb based diet program, portion control, increasing exercise, joining a fitness center or start home based exercise program and Weight Watchers or other Commercial based weight reduction program.    General Appearance: alert, oriented x 3, no acute distress.  Well-nourished and developed female.  Pleasant and interactive during questioning/examination.  Skin: warm and dry.   HEENT: Atraumatic.  pupils round and reactive to light and accommodation, oral mucosa pink and moist.  Nares patent without epistaxis.  External auditory canals are patent tympanic membranes intact.  Neck: supple, no JVD, trachea midline.  No thyromegaly  Lungs: CTA, unlabored breathing effort.  Heart: RRR, normal S1 and S2, no S3, no rub.  Abdomen: soft, non-tender, no palpable bladder, present bowel sounds to auscultation ×4.  No guarding or rigidity.  Extremities: no clubbing, cyanosis or edema.  Good range of motion actively and passively.  Symmetric muscle strength and development.  Ambulates independently.  There are flattened " medial arches bilaterally, pain on palpation along the plantar fascia of bilateral feet.  Heel raise normal.  Daniel/Rios sign negative.  Neuro: normal speech and mental status.  Cranial nerves II through XII intact.  No anosmia. DTR 2+; proprioception intact.  No focal motor/sensory deficits.        Assessment and Plan      Assessment/Plan:   Diagnoses and all orders for this visit:    1. Binge eating (Primary)  -     lisdexamfetamine (Vyvanse) 20 MG capsule; Take 1 capsule by mouth Every Morning  Dispense: 30 capsule; Refill: 0  -     CBC & Differential  -     Comprehensive Metabolic Panel  -     TSH  -     T4, Free    2. Depression with anxiety  -     desvenlafaxine (Pristiq) 100 MG 24 hr tablet; Take 1 tablet by mouth Daily.  Dispense: 90 tablet; Refill: 1    3. Bilateral plantar fasciitis  -     Ambulatory Referral to Podiatry    4. Impaired fasting glucose  -     Hemoglobin A1c  -     Comprehensive Metabolic Panel  -     TSH  -     T4, Free      Begin trial of Vyvanse, I have asked patient to notify the office should she develop any ill effects to the new medication.  Discussed need for reduction in sodium/salt/caffeine intake; improve sleep habits as able; inc formal CV exercise program with goal of vigorous activity most, if not all, days of the week; goal of 250 min of sustained HR CV exercise total per week.    Surveillance labs today    Referral to podiatry, would benefit greatly from custom orthotics.  Utilize ice massage therapy and stretching exercises.    Discussed need for stress/anxiety reducing techniques such as prayer/meditation/breathing and counting exercises and avoidance of stress producing environments/situations; will follow clinically.      Discussion Summary:    Discussed plan of care in detail with pt today; pt verb understanding and agrees.    I spent 30 minutes caring for Carolyn on this date of service. This time includes time spent by me in the following activities:preparing for  the visit, performing a medically appropriate examination and/or evaluation , counseling and educating the patient/family/caregiver, ordering medications, tests, or procedures, documenting information in the medical record, and care coordination    I have reviewed and updated all copied forward information, as appropriate.  I attest to the accuracy and relevance of any unchanged information.    Follow up:  No follow-ups on file.     There are no Patient Instructions on file for this visit.    Clement Velasco DO  11/27/23  13:07 EST

## 2023-11-14 LAB
ALBUMIN SERPL-MCNC: 4.2 G/DL (ref 3.9–4.9)
ALBUMIN/GLOB SERPL: 1.4 {RATIO} (ref 1.2–2.2)
ALP SERPL-CCNC: 97 IU/L (ref 44–121)
ALT SERPL-CCNC: 20 IU/L (ref 0–32)
AST SERPL-CCNC: 21 IU/L (ref 0–40)
BASOPHILS # BLD AUTO: 0.1 X10E3/UL (ref 0–0.2)
BASOPHILS NFR BLD AUTO: 1 %
BILIRUB SERPL-MCNC: 0.4 MG/DL (ref 0–1.2)
BUN SERPL-MCNC: 8 MG/DL (ref 6–20)
BUN/CREAT SERPL: 11 (ref 9–23)
CALCIUM SERPL-MCNC: 9.3 MG/DL (ref 8.7–10.2)
CHLORIDE SERPL-SCNC: 104 MMOL/L (ref 96–106)
CO2 SERPL-SCNC: 22 MMOL/L (ref 20–29)
CREAT SERPL-MCNC: 0.76 MG/DL (ref 0.57–1)
EGFRCR SERPLBLD CKD-EPI 2021: 105 ML/MIN/1.73
EOSINOPHIL # BLD AUTO: 0 X10E3/UL (ref 0–0.4)
EOSINOPHIL NFR BLD AUTO: 0 %
ERYTHROCYTE [DISTWIDTH] IN BLOOD BY AUTOMATED COUNT: 14.2 % (ref 11.7–15.4)
GLOBULIN SER CALC-MCNC: 3 G/DL (ref 1.5–4.5)
GLUCOSE SERPL-MCNC: 98 MG/DL (ref 70–99)
HBA1C MFR BLD: 5.9 % (ref 4.8–5.6)
HCT VFR BLD AUTO: 39.6 % (ref 34–46.6)
HGB BLD-MCNC: 12.5 G/DL (ref 11.1–15.9)
IMM GRANULOCYTES # BLD AUTO: 0.1 X10E3/UL (ref 0–0.1)
IMM GRANULOCYTES NFR BLD AUTO: 1 %
LYMPHOCYTES # BLD AUTO: 3.1 X10E3/UL (ref 0.7–3.1)
LYMPHOCYTES NFR BLD AUTO: 24 %
MCH RBC QN AUTO: 25.2 PG (ref 26.6–33)
MCHC RBC AUTO-ENTMCNC: 31.6 G/DL (ref 31.5–35.7)
MCV RBC AUTO: 80 FL (ref 79–97)
MONOCYTES # BLD AUTO: 0.7 X10E3/UL (ref 0.1–0.9)
MONOCYTES NFR BLD AUTO: 5 %
NEUTROPHILS # BLD AUTO: 8.9 X10E3/UL (ref 1.4–7)
NEUTROPHILS NFR BLD AUTO: 69 %
PLATELET # BLD AUTO: 346 X10E3/UL (ref 150–450)
POTASSIUM SERPL-SCNC: 4.2 MMOL/L (ref 3.5–5.2)
PROT SERPL-MCNC: 7.2 G/DL (ref 6–8.5)
RBC # BLD AUTO: 4.96 X10E6/UL (ref 3.77–5.28)
SODIUM SERPL-SCNC: 141 MMOL/L (ref 134–144)
T4 FREE SERPL-MCNC: 1.26 NG/DL (ref 0.82–1.77)
TSH SERPL DL<=0.005 MIU/L-ACNC: 1.34 UIU/ML (ref 0.45–4.5)
WBC # BLD AUTO: 12.9 X10E3/UL (ref 3.4–10.8)

## 2023-11-17 DIAGNOSIS — J30.2 ACUTE SEASONAL ALLERGIC RHINITIS: ICD-10-CM

## 2023-11-20 RX ORDER — LORATADINE 10 MG/1
10 TABLET ORAL DAILY
Qty: 30 TABLET | Refills: 0 | Status: SHIPPED | OUTPATIENT
Start: 2023-11-20

## 2023-11-22 ENCOUNTER — TELEPHONE (OUTPATIENT)
Dept: FAMILY MEDICINE CLINIC | Facility: CLINIC | Age: 33
End: 2023-11-22
Payer: MEDICAID

## 2023-11-22 DIAGNOSIS — M72.2 BILATERAL PLANTAR FASCIITIS: Primary | ICD-10-CM

## 2023-11-22 NOTE — TELEPHONE ENCOUNTER
A user error has taken place: encounter opened in error, closed for administrative reasons.  
Additional Notes: Patient consent was obtained to proceed with the visit and recommended plan of care after discussion of all risks and benefits, including the risks of COVID-19 exposure.
Detail Level: Simple

## 2023-11-29 ENCOUNTER — OFFICE VISIT (OUTPATIENT)
Dept: FAMILY MEDICINE CLINIC | Facility: CLINIC | Age: 33
End: 2023-11-29
Payer: MEDICAID

## 2023-11-29 VITALS
WEIGHT: 293 LBS | OXYGEN SATURATION: 99 % | BODY MASS INDEX: 53.92 KG/M2 | HEIGHT: 62 IN | TEMPERATURE: 98.5 F | SYSTOLIC BLOOD PRESSURE: 140 MMHG | HEART RATE: 113 BPM | RESPIRATION RATE: 16 BRPM | DIASTOLIC BLOOD PRESSURE: 90 MMHG

## 2023-11-29 DIAGNOSIS — F41.9 ANXIETY: Primary | ICD-10-CM

## 2023-11-29 DIAGNOSIS — F41.0 PANIC ATTACKS: ICD-10-CM

## 2023-11-29 RX ORDER — BUSPIRONE HYDROCHLORIDE 10 MG/1
10 TABLET ORAL 3 TIMES DAILY PRN
Qty: 30 TABLET | Refills: 1 | Status: SHIPPED | OUTPATIENT
Start: 2023-11-29

## 2023-11-29 NOTE — PROGRESS NOTES
"                      Established Patient        Chief Complaint:   Chief Complaint   Patient presents with    Anxiety     Pts anxiety is getting worse. Pt would also like to have a genesight test done.            History of Present Illness:    Carolyn Theodore is a 33 y.o. female who presents today for follow up of anxiety. Reports symptoms are worsening. Pristiq did not work for her so she stopped taking it. Thinks that it caused her to \"develop panic attacks\". Felt pain in middle of chest, up into her jaw and neck. Was originally occurring every 30-60 minutes. Have decreased in frequency but still happening. Reports that she noticed this after starting Pristiq 25mg and noted an increase in frequency and severity after dose increased and Vyvanse added. Increased Took hydroxyzine once to see if it would help and it seemed to help but took about 20 minutes to kick in.     Subjective     The following portions of the patient's history were reviewed and updated as appropriate: allergies, current medications, past family history, past medical history, past social history, past surgical history and problem list.    ALLERGIES  Allergies   Allergen Reactions    Doxycycline Nausea Only    Nsaids     Amoxicillin Rash    Penicillins Rash       ROS  Review of Systems   Psychiatric/Behavioral:  Positive for agitation. The patient is nervous/anxious.        Objective     Vital Signs:   /90   Pulse 113   Temp 98.5 °F (36.9 °C)   Resp 16   Ht 157.5 cm (62\")   Wt (!) 143 kg (315 lb)   LMP 10/25/2023 (Approximate)   SpO2 99%   BMI 57.61 kg/m²             Physical Exam   Physical Exam  Vitals and nursing note reviewed.   Cardiovascular:      Rate and Rhythm: Normal rate and regular rhythm.   Pulmonary:      Effort: Pulmonary effort is normal.      Breath sounds: Normal breath sounds.   Neurological:      Mental Status: She is alert and oriented to person, place, and time.   Psychiatric:         Mood and Affect: Mood " normal.         Behavior: Behavior normal.         Assessment and Plan      Assessment/Plan:   Diagnoses and all orders for this visit:    1. Anxiety (Primary)  -     busPIRone (BUSPAR) 10 MG tablet; Take 1 tablet by mouth 3 (Three) Times a Day As Needed (anxiety).  Dispense: 30 tablet; Refill: 1    2. Panic attacks  -     busPIRone (BUSPAR) 10 MG tablet; Take 1 tablet by mouth 3 (Three) Times a Day As Needed (anxiety).  Dispense: 30 tablet; Refill: 1    Risks, benefits, and potential side effects of current/new medications reviewed with patient.  Patient voiced understanding and wished to proceed with trial of buspirone for anxiety.     Patient encouraged to monitor symptoms for continued frequency. If symptoms continue, patient will follow up with PCP to discuss possibly changing Vyvanse.    Patient was encouraged to keep me informed of any acute changes, lack of improvement, or any new concerning symptoms.    Patient voiced understanding of all instructions and denied further questions.      I have reviewed and updated all copied forward information, as appropriate.  I attest to the accuracy and relevance of any unchanged information.      Follow up:  Return if symptoms worsen or fail to improve.     Patient Education:  There are no Patient Instructions on file for this visit.    Maira Abernathy, JEAN  11/29/23  13:52 EST          Please note that portions of this note may have been completed with a voice recognition program.

## 2023-12-15 RX ORDER — AZITHROMYCIN 250 MG/1
TABLET, FILM COATED ORAL
Qty: 6 TABLET | Refills: 0 | Status: SHIPPED | OUTPATIENT
Start: 2023-12-15

## 2023-12-20 DIAGNOSIS — J04.0 LARYNGITIS: Primary | ICD-10-CM

## 2023-12-20 RX ORDER — PREDNISONE 20 MG/1
20 TABLET ORAL DAILY
Qty: 7 TABLET | Refills: 0 | Status: SHIPPED | OUTPATIENT
Start: 2023-12-20 | End: 2023-12-27

## 2024-01-09 ENCOUNTER — TELEPHONE (OUTPATIENT)
Dept: FAMILY MEDICINE CLINIC | Facility: CLINIC | Age: 34
End: 2024-01-09

## 2024-01-09 DIAGNOSIS — J30.2 ACUTE SEASONAL ALLERGIC RHINITIS: ICD-10-CM

## 2024-01-09 DIAGNOSIS — K21.9 GASTROESOPHAGEAL REFLUX DISEASE WITHOUT ESOPHAGITIS: ICD-10-CM

## 2024-01-09 RX ORDER — LORATADINE 10 MG/1
10 TABLET ORAL DAILY
Qty: 30 TABLET | Refills: 0 | Status: SHIPPED | OUTPATIENT
Start: 2024-01-09

## 2024-01-09 NOTE — TELEPHONE ENCOUNTER
[facility] [address] [facility] [phone] [facility] [phone] FX    Relationship: Pharmacy    Best call back number:  [phone]    Requested Prescriptions:   Requested Prescriptions      No prescriptions requested or ordered in this encounter      lansoprazole (PREVACID) 30 MG capsule     Pharmacy where request should be sent:      [place] [place] [facility] [address] [facility] [phone] [facility] [phone] FX     Last office visit with prescribing clinician: 11/13/2023   Last telemedicine visit with prescribing clinician: Visit date not found   Next office visit with prescribing clinician: Visit date not found     Additional details provided by patient:     [facility] STATES THEY HAVE NOT FILLED THIS BEFORE.  MUST HAVE GOTTEN FILLED AT ANOTHER PHARMACY.  PLEASE SEND NEW SCRIPT    Does the patient have less than a 3 day supply:  [x] Yes  [] No    Would you like a call back once the refill request has been completed: [] Yes [x] No    If the office needs to give you a call back, can they leave a voicemail: [] Yes [x] No    Grazyna Staples, PCT   01/09/24 14:35 EST

## 2024-01-19 RX ORDER — LANSOPRAZOLE 30 MG/1
30 CAPSULE, DELAYED RELEASE ORAL DAILY
Qty: 90 CAPSULE | Refills: 0 | Status: SHIPPED | OUTPATIENT
Start: 2024-01-19

## 2024-01-31 NOTE — TELEPHONE ENCOUNTER
A PRIOR AUTH HAS BEEN RESUBMITTED FOR THE PATIENTS ADDERALL AND VYVANSE.    WAITING ON A RESPONSE FROM THE INSURANCE.    ADDERALL Key: [id number].    VYVANSE Key: [id number]   Medication:   Requested Prescriptions     Pending Prescriptions Disp Refills    meloxicam (MOBIC) 7.5 MG tablet [Pharmacy Med Name: MELOXICAM 7.5MG TABLETS] 30 tablet 1     Sig: TAKE 1 TABLET BY MOUTH DAILY AS NEEDED FOR PAIN        Last Filled:      Patient Phone Number: [phone] (home) [phone] (work)    Last appt: 1/17/2024   Next appt: 4/17/2024    Last OARRS:        No data to display

## 2024-02-27 ENCOUNTER — OFFICE VISIT (OUTPATIENT)
Dept: FAMILY MEDICINE CLINIC | Facility: CLINIC | Age: 34
End: 2024-02-27
Payer: MEDICAID

## 2024-02-27 VITALS
HEIGHT: 62 IN | SYSTOLIC BLOOD PRESSURE: 190 MMHG | HEART RATE: 120 BPM | RESPIRATION RATE: 16 BRPM | OXYGEN SATURATION: 99 % | DIASTOLIC BLOOD PRESSURE: 100 MMHG | WEIGHT: 293 LBS | BODY MASS INDEX: 53.92 KG/M2 | TEMPERATURE: 98.6 F

## 2024-02-27 DIAGNOSIS — J98.01 BRONCHOSPASM, ACUTE: ICD-10-CM

## 2024-02-27 DIAGNOSIS — R68.89 FLU-LIKE SYMPTOMS: Primary | ICD-10-CM

## 2024-02-27 DIAGNOSIS — R06.2 BILATERAL WHEEZING: ICD-10-CM

## 2024-02-27 RX ORDER — METHYLPREDNISOLONE 4 MG/1
TABLET ORAL
Qty: 21 TABLET | Refills: 0 | Status: SHIPPED | OUTPATIENT
Start: 2024-02-27 | End: 2024-02-27

## 2024-02-27 RX ORDER — METHYLPREDNISOLONE SODIUM SUCCINATE 40 MG/ML
80 INJECTION, POWDER, LYOPHILIZED, FOR SOLUTION INTRAMUSCULAR; INTRAVENOUS ONCE
Status: DISCONTINUED | OUTPATIENT
Start: 2024-02-27 | End: 2024-02-27

## 2024-02-27 RX ORDER — METHYLPREDNISOLONE SODIUM SUCCINATE 125 MG/2ML
125 INJECTION, POWDER, LYOPHILIZED, FOR SOLUTION INTRAMUSCULAR; INTRAVENOUS EVERY 6 HOURS
Status: DISCONTINUED | OUTPATIENT
Start: 2024-02-27 | End: 2024-03-05

## 2024-02-27 RX ORDER — METHYLPREDNISOLONE SODIUM SUCCINATE 125 MG/2ML
125 INJECTION, POWDER, LYOPHILIZED, FOR SOLUTION INTRAMUSCULAR; INTRAVENOUS EVERY 6 HOURS
Status: DISCONTINUED | OUTPATIENT
Start: 2024-02-27 | End: 2024-02-27

## 2024-02-27 RX ORDER — GUAIFENESIN AND DEXTROMETHORPHAN HYDROBROMIDE 600; 30 MG/1; MG/1
2 TABLET, EXTENDED RELEASE ORAL EVERY 12 HOURS SCHEDULED
Qty: 28 TABLET | Refills: 0 | Status: SHIPPED | OUTPATIENT
Start: 2024-02-27 | End: 2024-03-05

## 2024-02-27 RX ADMIN — METHYLPREDNISOLONE SODIUM SUCCINATE 125 MG: 125 INJECTION, POWDER, LYOPHILIZED, FOR SOLUTION INTRAMUSCULAR; INTRAVENOUS at 13:58

## 2024-02-27 NOTE — PROGRESS NOTES
"                      Established Patient        Chief Complaint:   Chief Complaint   Patient presents with    Cough    Fever    Generalized Body Aches    Wheezing         History of Present Illness:    Carolyn Theodore is a 33 y.o. female who presents today for c/o flu-like symptoms.    Sudden onset Sunday with productive cough, chest tightness/burning.   Fever onset yesterday, temp max 104. Associated body aches, wheezing, headache, fatigue.   Denies SOA, N/V.     Subjective     The following portions of the patient's history were reviewed and updated as appropriate: allergies, current medications, past family history, past medical history, past social history, past surgical history and problem list.    ALLERGIES  Allergies   Allergen Reactions    Doxycycline Nausea Only    Nsaids     Amoxicillin Rash    Penicillins Rash       ROS  Review of Systems   Constitutional:  Positive for fatigue and fever.   HENT:  Negative for congestion.    Respiratory:  Positive for cough, chest tightness and wheezing. Negative for shortness of breath.    Gastrointestinal:  Positive for diarrhea. Negative for nausea and vomiting.   Musculoskeletal:  Positive for myalgias.   Neurological:  Positive for headaches. Negative for dizziness.       Objective     Vital Signs:   BP (!) 190/100   Pulse 120   Temp 98.6 °F (37 °C)   Resp 16   Ht 157.5 cm (62\")   Wt (!) 143 kg (315 lb)   SpO2 99%   BMI 57.61 kg/m²                Physical Exam   Physical Exam  Vitals and nursing note reviewed.   Constitutional:       Appearance: She is obese.   HENT:      Nose: Mucosal edema and congestion present.      Right Turbinates: Swollen.      Left Turbinates: Swollen.      Mouth/Throat:      Pharynx: No pharyngeal swelling or posterior oropharyngeal erythema.   Cardiovascular:      Rate and Rhythm: Normal rate and regular rhythm.   Pulmonary:      Effort: Pulmonary effort is normal. No respiratory distress.      Breath sounds: Wheezing present. No " rhonchi.   Abdominal:      General: Bowel sounds are normal.      Palpations: Abdomen is soft.      Tenderness: There is no abdominal tenderness. There is no guarding or rebound.   Neurological:      Mental Status: She is alert and oriented to person, place, and time.   Psychiatric:         Mood and Affect: Mood normal.         Behavior: Behavior normal.         Assessment and Plan      Assessment/Plan:   Diagnoses and all orders for this visit:    1. Flu-like symptoms (Primary)  -     Discontinue: methylPREDNISolone (MEDROL) 4 MG dose pack; Take as directed on package instructions.  Dispense: 21 tablet; Refill: 0  -     guaifenesin-dextromethorphan 600-30 mg (MUCINEX DM)  MG tablet sustained-release 12 hour; Take 2 tablets by mouth Every 12 (Twelve) Hours for 7 days.  Dispense: 28 tablet; Refill: 0  -     Discontinue: methylPREDNISolone sodium succinate (SOLU-Medrol) injection 125 mg  -     methylPREDNISolone sodium succinate (SOLU-Medrol) injection 125 mg    2. Bilateral wheezing  -     Discontinue: methylPREDNISolone sodium succinate (SOLU-Medrol) injection 80 mg    3. Bronchospasm, acute  -     Discontinue: methylPREDNISolone sodium succinate (SOLU-Medrol) injection 80 mg    FLU/COVID negative in office today. Explained to patient that with recent onset of symptoms, tests may not be accurate that this time. Recommend RTC tomorrow for retesting.     Risks, benefits, and potential side effects of current/new medications reviewed with patient.  Patient voiced understanding and wished to proceed with treatment.    May alternate Acetaminophen and Ibuprofen every 4-6 hours as needed for pain, fever > 101.    Patient was encouraged to keep me informed of any acute changes, lack of improvement, or any new concerning symptoms.    Discussion Summary:  Discussed plan of care in detail with pt today; pt verb understanding and agrees.    I have reviewed and updated all copied forward information, as appropriate.  I  attest to the accuracy and relevance of any unchanged information.      Follow up:  Return if symptoms worsen or fail to improve.     Patient Education:  There are no Patient Instructions on file for this visit.    JEAN Roth  03/04/24  12:02 EST          Please note that portions of this note may have been completed with a voice recognition program.

## 2024-03-05 ENCOUNTER — TELEPHONE (OUTPATIENT)
Dept: FAMILY MEDICINE CLINIC | Facility: CLINIC | Age: 34
End: 2024-03-05

## 2024-03-05 NOTE — TELEPHONE ENCOUNTER
"  Caller: Carolyn Theodore \"Rachel\"    Relationship: Self    Best call back number: [phone]     What was the call regarding:   PATIENT STATED THAT SHE WAS SEEN IN THE OFFICE 02/27/2024 IS NOT FEELING ANY BETTER PATIENT STATED THAT SHE IS STILL COUGHING, COUGHING UP GREEN COLOR MUCUS AND VOMITING FROM COUGHING WITH A HORSE VOICE AND WOULD LIKE A CALL BACK REGARDING THESE ONGOING SYMPTOMS    "

## 2024-05-15 DIAGNOSIS — J30.2 ACUTE SEASONAL ALLERGIC RHINITIS: ICD-10-CM

## 2024-05-15 DIAGNOSIS — K21.9 GASTROESOPHAGEAL REFLUX DISEASE WITHOUT ESOPHAGITIS: ICD-10-CM

## 2024-05-15 RX ORDER — LORATADINE 10 MG/1
10 TABLET ORAL DAILY
Qty: 30 TABLET | Refills: 0 | Status: SHIPPED | OUTPATIENT
Start: 2024-05-15

## 2024-05-15 RX ORDER — LANSOPRAZOLE 30 MG/1
30 CAPSULE, DELAYED RELEASE ORAL DAILY
Qty: 30 CAPSULE | Refills: 0 | Status: SHIPPED | OUTPATIENT
Start: 2024-05-15

## 2024-06-17 DIAGNOSIS — J30.2 ACUTE SEASONAL ALLERGIC RHINITIS: ICD-10-CM

## 2024-06-17 DIAGNOSIS — K21.9 GASTROESOPHAGEAL REFLUX DISEASE WITHOUT ESOPHAGITIS: ICD-10-CM

## 2024-06-18 RX ORDER — LANSOPRAZOLE 30 MG/1
30 CAPSULE, DELAYED RELEASE ORAL DAILY
Qty: 30 CAPSULE | Refills: 0 | Status: SHIPPED | OUTPATIENT
Start: 2024-06-18

## 2024-06-18 RX ORDER — LORATADINE 10 MG/1
10 TABLET ORAL DAILY
Qty: 30 TABLET | Refills: 0 | Status: SHIPPED | OUTPATIENT
Start: 2024-06-18

## 2024-06-19 DIAGNOSIS — B37.31 VAGINAL YEAST INFECTION: Primary | ICD-10-CM

## 2024-06-19 RX ORDER — FLUCONAZOLE 150 MG/1
150 TABLET ORAL ONCE
Qty: 1 TABLET | Refills: 0 | Status: SHIPPED | OUTPATIENT
Start: 2024-06-19 | End: 2024-06-19

## 2024-08-16 DIAGNOSIS — B37.31 VAGINAL YEAST INFECTION: Primary | ICD-10-CM

## 2024-08-16 RX ORDER — FLUCONAZOLE 150 MG/1
150 TABLET ORAL ONCE
COMMUNITY
Start: 2024-06-19 | End: 2024-08-16 | Stop reason: SDUPTHER

## 2024-08-16 RX ORDER — FLUCONAZOLE 150 MG/1
150 TABLET ORAL ONCE
Qty: 1 TABLET | Refills: 0 | Status: SHIPPED | OUTPATIENT
Start: 2024-08-16 | End: 2024-08-16

## 2024-10-12 ENCOUNTER — HOSPITAL ENCOUNTER (EMERGENCY)
Facility: HOSPITAL | Age: 34
Discharge: HOME | End: 2024-10-12
Attending: EMERGENCY MEDICINE
Payer: MEDICAID

## 2024-10-12 VITALS
BODY MASS INDEX: 53.92 KG/M2 | WEIGHT: 293 LBS | SYSTOLIC BLOOD PRESSURE: 164 MMHG | HEIGHT: 62 IN | HEART RATE: 89 BPM | TEMPERATURE: 98 F | DIASTOLIC BLOOD PRESSURE: 99 MMHG | RESPIRATION RATE: 16 BRPM | OXYGEN SATURATION: 99 %

## 2024-10-12 DIAGNOSIS — N20.1 RIGHT URETERAL STONE: Primary | ICD-10-CM

## 2024-10-12 LAB
ALBUMIN SERPL-MCNC: 3.9 G/DL (ref 3.5–5.2)
ALBUMIN/GLOB SERPL: 1.3 G/DL
ALP SERPL-CCNC: 92 U/L (ref 39–117)
ALT SERPL W P-5'-P-CCNC: 16 U/L (ref 1–33)
ANION GAP SERPL CALCULATED.3IONS-SCNC: 9.8 MMOL/L (ref 5–15)
AST SERPL-CCNC: 16 U/L (ref 1–32)
BACTERIA UR QL AUTO: ABNORMAL /HPF
BASOPHILS # BLD AUTO: 0.08 10*3/MM3 (ref 0–0.2)
BASOPHILS NFR BLD AUTO: 0.5 % (ref 0–1.5)
BILIRUB SERPL-MCNC: 0.7 MG/DL (ref 0–1.2)
BILIRUB UR QL STRIP: NEGATIVE
BUN SERPL-MCNC: 9 MG/DL (ref 6–20)
BUN/CREAT SERPL: 7.4 (ref 7–25)
CALCIUM SPEC-SCNC: 9.1 MG/DL (ref 8.6–10.5)
CHLORIDE SERPL-SCNC: 101 MMOL/L (ref 98–107)
CLARITY UR: ABNORMAL
CO2 SERPL-SCNC: 24.2 MMOL/L (ref 22–29)
COLOR UR: YELLOW
CREAT SERPL-MCNC: 1.21 MG/DL (ref 0.57–1)
DEPRECATED RDW RBC AUTO: 42 FL (ref 37–54)
EGFRCR SERPLBLD CKD-EPI 2021: 59.7 ML/MIN/1.73
EOSINOPHIL # BLD AUTO: 0.03 10*3/MM3 (ref 0–0.4)
EOSINOPHIL NFR BLD AUTO: 0.2 % (ref 0.3–6.2)
ERYTHROCYTE [DISTWIDTH] IN BLOOD BY AUTOMATED COUNT: 15.2 % (ref 12.3–15.4)
GLOBULIN UR ELPH-MCNC: 3.1 GM/DL
GLUCOSE SERPL-MCNC: 103 MG/DL (ref 65–99)
GLUCOSE UR STRIP-MCNC: NEGATIVE MG/DL
HCT VFR BLD AUTO: 38.6 % (ref 34–46.6)
HGB BLD-MCNC: 12 G/DL (ref 12–15.9)
HGB UR QL STRIP.AUTO: NEGATIVE
HOLD SPECIMEN: NORMAL
HOLD SPECIMEN: NORMAL
HYALINE CASTS UR QL AUTO: ABNORMAL /LPF
IMM GRANULOCYTES # BLD AUTO: 0.1 10*3/MM3 (ref 0–0.05)
IMM GRANULOCYTES NFR BLD AUTO: 0.6 % (ref 0–0.5)
KETONES UR QL STRIP: NEGATIVE
LEUKOCYTE ESTERASE UR QL STRIP.AUTO: ABNORMAL
LIPASE SERPL-CCNC: 21 U/L (ref 13–60)
LYMPHOCYTES # BLD AUTO: 2.56 10*3/MM3 (ref 0.7–3.1)
LYMPHOCYTES NFR BLD AUTO: 16 % (ref 19.6–45.3)
MCH RBC QN AUTO: 24.1 PG (ref 26.6–33)
MCHC RBC AUTO-ENTMCNC: 31.1 G/DL (ref 31.5–35.7)
MCV RBC AUTO: 77.5 FL (ref 79–97)
MONOCYTES # BLD AUTO: 0.84 10*3/MM3 (ref 0.1–0.9)
MONOCYTES NFR BLD AUTO: 5.2 % (ref 5–12)
NEUTROPHILS NFR BLD AUTO: 12.41 10*3/MM3 (ref 1.7–7)
NEUTROPHILS NFR BLD AUTO: 77.5 % (ref 42.7–76)
NITRITE UR QL STRIP: NEGATIVE
NRBC BLD AUTO-RTO: 0 /100 WBC (ref 0–0.2)
PH UR STRIP.AUTO: 7.5 [PH] (ref 5–8)
PLATELET # BLD AUTO: 348 10*3/MM3 (ref 140–450)
PMV BLD AUTO: 9.9 FL (ref 6–12)
POTASSIUM SERPL-SCNC: 3.9 MMOL/L (ref 3.5–5.2)
PROT SERPL-MCNC: 7 G/DL (ref 6–8.5)
PROT UR QL STRIP: NEGATIVE
RBC # BLD AUTO: 4.98 10*6/MM3 (ref 3.77–5.28)
RBC # UR STRIP: ABNORMAL /HPF
REF LAB TEST METHOD: ABNORMAL
SODIUM SERPL-SCNC: 135 MMOL/L (ref 136–145)
SP GR UR STRIP: 1.02 (ref 1–1.03)
SQUAMOUS #/AREA URNS HPF: ABNORMAL /HPF
UROBILINOGEN UR QL STRIP: ABNORMAL
WBC # UR STRIP: ABNORMAL /HPF
WBC NRBC COR # BLD AUTO: 16.02 10*3/MM3 (ref 3.4–10.8)
WHOLE BLOOD HOLD COAG: NORMAL
WHOLE BLOOD HOLD SPECIMEN: NORMAL

## 2024-10-12 PROCEDURE — 99283 EMERGENCY DEPT VISIT LOW MDM: CPT

## 2024-10-12 PROCEDURE — 83690 ASSAY OF LIPASE: CPT | Performed by: EMERGENCY MEDICINE

## 2024-10-12 PROCEDURE — 25010000002 KETOROLAC TROMETHAMINE PER 15 MG: Performed by: EMERGENCY MEDICINE

## 2024-10-12 PROCEDURE — 25010000002 ONDANSETRON PER 1 MG: Performed by: EMERGENCY MEDICINE

## 2024-10-12 PROCEDURE — 81001 URINALYSIS AUTO W/SCOPE: CPT | Performed by: EMERGENCY MEDICINE

## 2024-10-12 PROCEDURE — 25010000002 MORPHINE PER 10 MG: Performed by: EMERGENCY MEDICINE

## 2024-10-12 PROCEDURE — 96374 THER/PROPH/DIAG INJ IV PUSH: CPT

## 2024-10-12 PROCEDURE — 85025 COMPLETE CBC W/AUTO DIFF WBC: CPT | Performed by: EMERGENCY MEDICINE

## 2024-10-12 PROCEDURE — 96375 TX/PRO/DX INJ NEW DRUG ADDON: CPT

## 2024-10-12 PROCEDURE — 80053 COMPREHEN METABOLIC PANEL: CPT | Performed by: EMERGENCY MEDICINE

## 2024-10-12 RX ORDER — SODIUM CHLORIDE 0.9 % (FLUSH) 0.9 %
10 SYRINGE (ML) INJECTION AS NEEDED
Status: DISCONTINUED | OUTPATIENT
Start: 2024-10-12 | End: 2024-10-12 | Stop reason: HOSPADM

## 2024-10-12 RX ORDER — OXYCODONE AND ACETAMINOPHEN 7.5; 325 MG/1; MG/1
1 TABLET ORAL EVERY 6 HOURS PRN
Qty: 15 TABLET | Refills: 0 | Status: SHIPPED | OUTPATIENT
Start: 2024-10-12 | End: 2024-10-14

## 2024-10-12 RX ORDER — KETOROLAC TROMETHAMINE 30 MG/ML
15 INJECTION, SOLUTION INTRAMUSCULAR; INTRAVENOUS ONCE
Status: COMPLETED | OUTPATIENT
Start: 2024-10-12 | End: 2024-10-12

## 2024-10-12 RX ORDER — ONDANSETRON 2 MG/ML
4 INJECTION INTRAMUSCULAR; INTRAVENOUS ONCE
Status: COMPLETED | OUTPATIENT
Start: 2024-10-12 | End: 2024-10-12

## 2024-10-12 RX ADMIN — ONDANSETRON 4 MG: 2 INJECTION INTRAMUSCULAR; INTRAVENOUS at 17:29

## 2024-10-12 RX ADMIN — KETOROLAC TROMETHAMINE 15 MG: 30 INJECTION, SOLUTION INTRAMUSCULAR; INTRAVENOUS at 17:30

## 2024-10-12 RX ADMIN — MORPHINE SULFATE 8 MG: 4 INJECTION, SOLUTION INTRAMUSCULAR; INTRAVENOUS at 17:30

## 2024-10-12 NOTE — ED PROVIDER NOTES
Subjective   History of Present Illness    Pt presents with right flank pain and nausea for 4 days.  Two days ago she was dx 8 mm R kidney stone at a hospital in [place] (she was out of town for a wedding).  She was prescribed Toradol, Zofran and Flomax.  She has been taking them but having only partial relief.  She says she has appt with urologist on Monday (2 days) but doesn't feel she can wait that long.  No vomiting, dysuria or fever.  She has a history of kidney stones and has required removal once in the past.  She is not currently established with a urologist before the upcoming appt.    History provided by:  Patient      Review of Systems    Past Medical History:   Diagnosis Date    Abnormal liver enzymes     Asthma     Positive methacholine challenge test.  Frequent bronchitis.    Depression     Eosinophilia     Resolved in the past once off of Singulair.    Extreme obesity with alveolar hypoventilation 2016    GERD (gastroesophageal reflux disease)     w/o esophagitis    Hypertension     Morbid obesity     Obesity      Negative sleep study in the past, no nocturnal hypoxemia on the study    Peripheral edema 2016    Renal calculi     Sleep apnea        Allergies   Allergen Reactions    Doxycycline Nausea Only    Nsaids     Amoxicillin Rash    Penicillins Rash       Past Surgical History:   Procedure Laterality Date     SECTION      CHOLECYSTECTOMY      GASTRIC SLEEVE LAPAROSCOPIC      KIDNEY STONE SURGERY      TONSILLECTOMY         Family History   Problem Relation Age of Onset    Hypertension Father     Diabetes Father     Alcohol abuse Other     Cancer Other     Hypertension Other     Hypertension Other        Social History     Socioeconomic History    Marital status:    Tobacco Use    Smoking status: Never     Passive exposure: Never    Smokeless tobacco: Never   Vaping Use    Vaping status: Every Day    Substances: Nicotine, Flavoring   Substance and Sexual Activity    Alcohol use:  No    Drug use: No    Sexual activity: Yes     Partners: Male     Birth control/protection: None           Objective   Physical Exam  Vitals and nursing note reviewed.   Constitutional:       General: She is not in acute distress.     Appearance: Normal appearance. She is obese. She is not ill-appearing.   HENT:      Head: Normocephalic and atraumatic.      Mouth/Throat:      Mouth: Mucous membranes are moist.   Eyes:      General: No scleral icterus.        Right eye: No discharge.         Left eye: No discharge.      Conjunctiva/sclera: Conjunctivae normal.   Cardiovascular:      Rate and Rhythm: Normal rate and regular rhythm.      Heart sounds: No murmur heard.  Pulmonary:      Effort: Pulmonary effort is normal. No respiratory distress.      Breath sounds: Normal breath sounds. No wheezing.   Abdominal:      General: Bowel sounds are normal. There is no distension.      Palpations: Abdomen is soft.      Tenderness: There is no abdominal tenderness. There is no guarding or rebound.   Musculoskeletal:         General: No swelling. Normal range of motion.      Cervical back: Normal range of motion and neck supple.   Skin:     General: Skin is warm and dry.      Findings: No rash.   Neurological:      General: No focal deficit present.      Mental Status: She is alert. Mental status is at baseline.   Psychiatric:         Mood and Affect: Mood normal.         Behavior: Behavior normal.         Thought Content: Thought content normal.         Procedures           ED Course    I reviewed outside records.  Outside ED note 10/10/24 for R flank pain and nausea without vomiting.  CT report from that visit shows an 8 mm stone in the right renal pelvis with hydronephrosis.  She was discharged with Todaol, Zofran, Flomax per Epic records and her discharge papers.  I can see an appt on 10/14 with a urology NP here in [place].    WBC 16, was 13.4 the other day and historical values run 12-16.  Creatinine 1.2, two days ago was  1.1.  UA with some pyuria but shows squamous contamination and no bacteria.      Pain better with meds.  No urology call this weekend and she has appt in 2 days with urology.  She will likely need intervention given the size of the stone but does not appear to have infection or renal failure today.  She is counseled if she develops fever, persistent vomiting or any concerning change to consider presenting to a facility in [place] where urology call is present on weekends.     Patient stable on serial rechecks.  Discussed findings, concerns, plan of care, expected course, reasons to return and followup.  Provided the opportunity to ask questions.                                   ZULEYKA reviewed by Scot Nomran MD       Medical Decision Making  Problems Addressed:  Right ureteral stone: complicated acute illness or injury    Amount and/or Complexity of Data Reviewed  External Data Reviewed: notes.  Labs: ordered. Decision-making details documented in ED Course.    Risk  Prescription drug management.  Decision regarding hospitalization.        Final diagnoses:   Right ureteral stone       ED Disposition  ED Disposition       ED Disposition   Discharge    Condition   Stable    Comment   --               Keyana Fry, APRN  [address] [facility]  [address]  [place] [id number] [phone]    In 2 days  As scheduled         Medication List        New Prescriptions      oxyCODONE-acetaminophen 7.5-325 MG per tablet  Commonly known as: PERCOCET  Take 1 tablet by mouth Every 6 (Six) Hours As Needed for Severe Pain.               Where to Get Your Medications        These medications were sent to [facility] [id number] - [place], [place] - [address] AT [facility] - [phone] [facility] [phone]   [address], [facility] [address]      Phone: [phone]   oxyCODONE-acetaminophen 7.5-325 MG per tablet            Scot Norman MD  10/12/24 0673

## 2024-10-14 ENCOUNTER — OFFICE VISIT (OUTPATIENT)
Dept: UROLOGY | Facility: CLINIC | Age: 34
End: 2024-10-14
Payer: MEDICAID

## 2024-10-14 ENCOUNTER — PATIENT ROUNDING (BHMG ONLY) (OUTPATIENT)
Dept: UROLOGY | Facility: CLINIC | Age: 34
End: 2024-10-14
Payer: MEDICAID

## 2024-10-14 VITALS
BODY MASS INDEX: 53.92 KG/M2 | DIASTOLIC BLOOD PRESSURE: 64 MMHG | OXYGEN SATURATION: 100 % | SYSTOLIC BLOOD PRESSURE: 128 MMHG | TEMPERATURE: 96.8 F | HEART RATE: 77 BPM | HEIGHT: 62 IN | WEIGHT: 293 LBS

## 2024-10-14 DIAGNOSIS — N13.2 HYDRONEPHROSIS WITH URINARY OBSTRUCTION DUE TO RENAL CALCULUS: ICD-10-CM

## 2024-10-14 DIAGNOSIS — N20.0 KIDNEY STONE: Primary | ICD-10-CM

## 2024-10-14 LAB
BILIRUB BLD-MCNC: NEGATIVE MG/DL
CLARITY, POC: ABNORMAL
COLOR UR: YELLOW
EXPIRATION DATE: ABNORMAL
GLUCOSE UR STRIP-MCNC: NEGATIVE MG/DL
KETONES UR QL: NEGATIVE
LEUKOCYTE EST, POC: ABNORMAL
Lab: ABNORMAL
NITRITE UR-MCNC: NEGATIVE MG/ML
PH UR: 6 [PH] (ref 5–8)
PROT UR STRIP-MCNC: NEGATIVE MG/DL
RBC # UR STRIP: ABNORMAL /UL
SP GR UR: 1.02 (ref 1–1.03)
UROBILINOGEN UR QL: ABNORMAL

## 2024-10-14 RX ORDER — OXYCODONE HYDROCHLORIDE 5 MG/1
5 TABLET ORAL EVERY 6 HOURS PRN
Qty: 12 TABLET | Refills: 0 | Status: SHIPPED | OUTPATIENT
Start: 2024-10-14 | End: 2024-10-17

## 2024-10-14 NOTE — PROGRESS NOTES
Office Visit Kidney Stone      Patient Name: Carolyn Theodore  : 1990   MRN: [id number]     Chief Complaint:   Chief Complaint   Patient presents with    Establish Care     Kidney stones  Still symptomatic  This morning bloating and burning with urination         Referring Provider: No ref. provider found    History of Present Illness: Carolyn Theodore is a 34 y.o. female who presents today for nephrolithiasis.  Patient reports history of basket retrieval in .  Patient was in [place] and presented to ED on 10/10/24 with right flank pain.  CT confirmed renal stone with hydronephrosis.  She was discharged with toradol, zofran, and flomax.   She returned to the ED on 10/12/24 with similar complaints and was discharged on oxycodone with tylenol.  Patient reports right flank pain 3/10, nausea, decreased PO intake, chills, dysuria, and nausea. No fever, vomiting, hematuria.  Reports she cannot skip doses of pain medication or pain is 10/10.  Last dose of pain medication was last night.  She does get some relief with medication.     Stone prevention medications: none    Stone related history  Family history of stones:   yes, father and sister  Renal disease or anatomic abnormality: no  Malabsorptive disease or gastric bypass: yes, gastric sleeve  Frequent UTI's    no  Parathyroid disease    no    Diet  4-6 glasses of water per day  1 sodas per day  Low intake of fruit/vegetables-yes  Lots of salt/sodium-some  Lots of meat/protein from animal source-yes  Fast food 2-3 times a week.     Subjective      Review of System: As noted in HPI.    Past Medical History:   Past Medical History:   Diagnosis Date    Abnormal liver enzymes     Asthma     Positive methacholine challenge test.  Frequent bronchitis.    Depression     Eosinophilia     Resolved in the past once off of Singulair.    Extreme obesity with alveolar hypoventilation 2016    GERD (gastroesophageal reflux disease)     w/o esophagitis     Hypertension     Morbid obesity     Obesity      Negative sleep study in the past, no nocturnal hypoxemia on the study    Peripheral edema 2016    Renal calculi     Sleep apnea        Past Surgical History:   Past Surgical History:   Procedure Laterality Date     SECTION      CHOLECYSTECTOMY      GASTRIC SLEEVE LAPAROSCOPIC      KIDNEY STONE SURGERY      TONSILLECTOMY         Family History:   Family History   Problem Relation Age of Onset    Hypertension Father     Diabetes Father     Alcohol abuse Other     Cancer Other     Hypertension Other     Hypertension Other        Social History:   Social History     Socioeconomic History    Marital status:    Tobacco Use    Smoking status: Never     Passive exposure: Never    Smokeless tobacco: Never   Vaping Use    Vaping status: Every Day    Substances: Nicotine, Flavoring   Substance and Sexual Activity    Alcohol use: No    Drug use: No    Sexual activity: Yes     Partners: Male     Birth control/protection: None       Medications:     Current Outpatient Medications:     albuterol (PROVENTIL) (2.5 MG/3ML) 0.083% nebulizer solution, Take 2.5 mg by nebulization Every 4 (Four) Hours As Needed for Wheezing., Disp: 180 mL, Rfl: 0    Albuterol-Budesonide 90-80 MCG/ACT aerosol, Inhale 2 Inhalations Every 2 (Two) Hours As Needed (cough, shortness of breath, wheezing)., Disp: 10.7 g, Rfl: 0    Ascorbic Acid (Vitamin C) 500 MG capsule, Take  by mouth., Disp: , Rfl:     Biotin 5 MG capsule, Take  by mouth., Disp: , Rfl:     Budeson-Glycopyrrol-Formoterol (BREZTRI) 160-9-4.8 MCG/ACT aerosol inhaler, Inhale 2 puffs 2 (Two) Times a Day., Disp: 1 each, Rfl: 0    esomeprazole (nexIUM) 20 MG capsule, Take 1 capsule by mouth Every Morning Before Breakfast., Disp: , Rfl:     lisdexamfetamine (Vyvanse) 20 MG capsule, Take 1 capsule by mouth Every Morning, Disp: 30 capsule, Rfl: 0    loratadine (CLARITIN) 10 MG tablet, TAKE ONE TABLET BY MOUTH EVERY DAY, Disp: 30  "tablet, Rfl: 0    multivitamin with minerals tablet tablet, Take 1 tablet by mouth Daily., Disp: , Rfl:     predniSONE (DELTASONE) 20 MG tablet, 3 po daily for 3 days, 2 po daily for 3 days, 1 po daily for 3 days, Disp: 18 tablet, Rfl: 0    vitamin D3 125 MCG (5000 UT) capsule capsule, Take 1 capsule by mouth Daily., Disp: , Rfl:     oxyCODONE (Roxicodone) 5 MG immediate release tablet, Take 1 tablet by mouth Every 6 (Six) Hours As Needed for Severe Pain for up to 3 days., Disp: 12 tablet, Rfl: 0    Allergies:   Allergies   Allergen Reactions    Doxycycline Nausea Only    Nsaids     Amoxicillin Rash    Penicillins Rash       Objective     Physical Exam:   Vital Signs:   Vitals:    10/14/24 0832   BP: 128/64   Pulse: 77   Temp: 96.8 °F (36 °C)   SpO2: 100%   Weight: (!) 144 kg (318 lb)   Height: 157.5 cm (62.01\")     Body mass index is 58.15 kg/m².   Physical Exam  Vitals and nursing note reviewed.   Constitutional:       General: She is awake. She is not in acute distress.     Appearance: She is morbidly obese.      Comments: Patient grimacing   Pulmonary:      Effort: Pulmonary effort is normal.   Neurological:      Mental Status: She is alert and oriented to person, place, and time. Mental status is at baseline.   Psychiatric:         Mood and Affect: Mood normal.         Behavior: Behavior is cooperative.          Labs:   Brief Urine Lab Results  (Last result in the past 365 days)        Color   Clarity   Blood   Leuk Est   Nitrite   Protein   CREAT   Urine HCG        10/14/24 0849 Yellow   Cloudy   Small   Small (1+)   Negative   Negative                        Lab Results   Component Value Date    GLUCOSE 103 (H) 10/12/2024    CALCIUM 9.1 10/12/2024     (L) 10/12/2024    K 3.9 10/12/2024    CO2 24.2 10/12/2024     10/12/2024    BUN 9 10/12/2024    CREATININE 1.21 (H) 10/12/2024    EGFRIFAFRI 111 07/28/2021    EGFRIFNONA 96 07/28/2021    BCR 7.4 10/12/2024    ANIONGAP 9.8 10/12/2024       Lab Results " "  Component Value Date    WBC 16.02 (H) 10/12/2024    HGB 12.0 10/12/2024    HCT 38.6 10/12/2024    MCV 77.5 (L) 10/12/2024     10/12/2024       Stone Analysis  No components found for: \"NZHRV7LWUZRW\", \"ZXNFN0DXNPLX\", \"EIDAM4GQSDMA\"    Images:   CT Abdomen Pelvis With Contrast    Result Date: 10/11/2024  1.    Mild right hydronephrosis proximal to an 8 mm calculus in the renal pelvis. Mild reactive edema. 2.     Additional tiny bilateral renal calculi. 3.    Chronic and incidental findings as above. Dictated By:    - 10/11/2024 12:51 AM EDT Electronically Signed By:  Ralph Abraham MD - 10/11/2024 12:55 AM EDT      I have reviewed the above imaging and labs.    Assessment / Plan      Assessment:   Diagnoses and all orders for this visit:    1. Kidney stone (Primary)  -     POC Urinalysis Dipstick, Automated  -     oxyCODONE (Roxicodone) 5 MG immediate release tablet; Take 1 tablet by mouth Every 6 (Six) Hours As Needed for Severe Pain for up to 3 days.  Dispense: 12 tablet; Refill: 0    2. Hydronephrosis with urinary obstruction due to renal calculus         34 y.o. female presents with 8 mm right renal pelvis stone with hydronephrosis.  Currently taking toradol and oxycodone with tylenol.  Advised to discontinue both.  Take acetaminophen 1000 mg every 6 hours and oxycodone without tylenol q. 6 hours PRN for severe pain.  Patient has been in ED 2 times since last week with pain.  WBC increased.  Nontoxic appearing with UA showing small leukocytes, no nitrites.  UA microscopy at ED showed contamination.      We had informed discussion about the causes of stones and the main treatments for nephrolithiasis.  The 3 main surgical treatments for stones are percutaneous nephrolithotomy, ureteroscopy and laser lithotripsy, and shockwave lithotripsy. Based on patient factors and the stone size and location, I recommend right URS/LL with stent insertion. The patient is discussed with Dr. uBrton who independently reviews " and interprets associated imaging and agrees with plan.  We discussed the risks, benefits, and alternatives to this therapy.  The main risks that we discussed were bleeding, urinary infection, damage to nearby structures, need for further procedures, and cardiopulmonary complications from anesthesia.  Discussed expectations of stent and stent removal in 1 week in office.  The patient voiced understanding and wished to proceed.  Advised if symptoms worsen to come to [facility] ED.     Plan:  Fluid intake goal ~ 2.5L per day.  Stop oxycodone-acetaminophen and toradol  Start acetaminophen 1000 mg q. 6 hours  Start oxycodone 5 mg PO q. 6 hours PRN for severe pain  Will call patient with surgery date.     Surgical Concerns: BMI 58, GERD, asthma    Follow Up:   Return for Will call with surgery date.      JEAN Mclean  [facility] [place] [facility]

## 2024-10-15 ENCOUNTER — APPOINTMENT (OUTPATIENT)
Dept: CT IMAGING | Facility: HOSPITAL | Age: 34
End: 2024-10-15
Payer: MEDICAID

## 2024-10-15 ENCOUNTER — HOSPITAL ENCOUNTER (OUTPATIENT)
Facility: HOSPITAL | Age: 34
Discharge: HOME | End: 2024-10-16
Attending: STUDENT IN AN ORGANIZED HEALTH CARE EDUCATION/TRAINING PROGRAM | Admitting: UROLOGY
Payer: MEDICAID

## 2024-10-15 DIAGNOSIS — N13.2 HYDRONEPHROSIS WITH URINARY OBSTRUCTION DUE TO RENAL CALCULUS: Primary | ICD-10-CM

## 2024-10-15 DIAGNOSIS — N20.0 KIDNEY STONE: ICD-10-CM

## 2024-10-15 LAB
ALBUMIN SERPL-MCNC: 4 G/DL (ref 3.5–5.2)
ALBUMIN/GLOB SERPL: 1.3 G/DL
ALP SERPL-CCNC: 96 U/L (ref 39–117)
ALT SERPL W P-5'-P-CCNC: 16 U/L (ref 1–33)
ANION GAP SERPL CALCULATED.3IONS-SCNC: 11.1 MMOL/L (ref 5–15)
AST SERPL-CCNC: 18 U/L (ref 1–32)
BACTERIA UR QL AUTO: ABNORMAL /HPF
BASOPHILS # BLD AUTO: 0.1 10*3/MM3 (ref 0–0.2)
BASOPHILS NFR BLD AUTO: 0.9 % (ref 0–1.5)
BILIRUB SERPL-MCNC: 0.5 MG/DL (ref 0–1.2)
BILIRUB UR QL STRIP: NEGATIVE
BUN SERPL-MCNC: 11 MG/DL (ref 6–20)
BUN/CREAT SERPL: 11.1 (ref 7–25)
CALCIUM SPEC-SCNC: 9.3 MG/DL (ref 8.6–10.5)
CHLORIDE SERPL-SCNC: 104 MMOL/L (ref 98–107)
CLARITY UR: CLEAR
CO2 SERPL-SCNC: 25.9 MMOL/L (ref 22–29)
COLOR UR: YELLOW
CREAT SERPL-MCNC: 0.99 MG/DL (ref 0.57–1)
DEPRECATED RDW RBC AUTO: 41.1 FL (ref 37–54)
EGFRCR SERPLBLD CKD-EPI 2021: 75.9 ML/MIN/1.73
EOSINOPHIL # BLD AUTO: 0.2 10*3/MM3 (ref 0–0.4)
EOSINOPHIL NFR BLD AUTO: 1.7 % (ref 0.3–6.2)
ERYTHROCYTE [DISTWIDTH] IN BLOOD BY AUTOMATED COUNT: 14.7 % (ref 12.3–15.4)
GLOBULIN UR ELPH-MCNC: 3.1 GM/DL
GLUCOSE SERPL-MCNC: 97 MG/DL (ref 65–99)
GLUCOSE UR STRIP-MCNC: NEGATIVE MG/DL
HCT VFR BLD AUTO: 38.6 % (ref 34–46.6)
HGB BLD-MCNC: 11.8 G/DL (ref 12–15.9)
HGB UR QL STRIP.AUTO: NEGATIVE
HOLD SPECIMEN: NORMAL
HOLD SPECIMEN: NORMAL
HYALINE CASTS UR QL AUTO: ABNORMAL /LPF
IMM GRANULOCYTES # BLD AUTO: 0.12 10*3/MM3 (ref 0–0.05)
IMM GRANULOCYTES NFR BLD AUTO: 1 % (ref 0–0.5)
KETONES UR QL STRIP: NEGATIVE
LEUKOCYTE ESTERASE UR QL STRIP.AUTO: ABNORMAL
LIPASE SERPL-CCNC: 21 U/L (ref 13–60)
LYMPHOCYTES # BLD AUTO: 2.58 10*3/MM3 (ref 0.7–3.1)
LYMPHOCYTES NFR BLD AUTO: 22 % (ref 19.6–45.3)
MCH RBC QN AUTO: 23.8 PG (ref 26.6–33)
MCHC RBC AUTO-ENTMCNC: 30.6 G/DL (ref 31.5–35.7)
MCV RBC AUTO: 78 FL (ref 79–97)
MONOCYTES # BLD AUTO: 0.67 10*3/MM3 (ref 0.1–0.9)
MONOCYTES NFR BLD AUTO: 5.7 % (ref 5–12)
NEUTROPHILS NFR BLD AUTO: 68.7 % (ref 42.7–76)
NEUTROPHILS NFR BLD AUTO: 8.04 10*3/MM3 (ref 1.7–7)
NITRITE UR QL STRIP: NEGATIVE
NRBC BLD AUTO-RTO: 0 /100 WBC (ref 0–0.2)
PH UR STRIP.AUTO: 7 [PH] (ref 5–8)
PLATELET # BLD AUTO: 352 10*3/MM3 (ref 140–450)
PMV BLD AUTO: 9.7 FL (ref 6–12)
POTASSIUM SERPL-SCNC: 3.9 MMOL/L (ref 3.5–5.2)
PROT SERPL-MCNC: 7.1 G/DL (ref 6–8.5)
PROT UR QL STRIP: NEGATIVE
RBC # BLD AUTO: 4.95 10*6/MM3 (ref 3.77–5.28)
RBC # UR STRIP: ABNORMAL /HPF
REF LAB TEST METHOD: ABNORMAL
SODIUM SERPL-SCNC: 141 MMOL/L (ref 136–145)
SP GR UR STRIP: 1.01 (ref 1–1.03)
SQUAMOUS #/AREA URNS HPF: ABNORMAL /HPF
UROBILINOGEN UR QL STRIP: ABNORMAL
WBC # UR STRIP: ABNORMAL /HPF
WBC NRBC COR # BLD AUTO: 11.71 10*3/MM3 (ref 3.4–10.8)
WHOLE BLOOD HOLD COAG: NORMAL
WHOLE BLOOD HOLD SPECIMEN: NORMAL

## 2024-10-15 PROCEDURE — 96375 TX/PRO/DX INJ NEW DRUG ADDON: CPT

## 2024-10-15 PROCEDURE — 81001 URINALYSIS AUTO W/SCOPE: CPT | Performed by: STUDENT IN AN ORGANIZED HEALTH CARE EDUCATION/TRAINING PROGRAM

## 2024-10-15 PROCEDURE — 74176 CT ABD & PELVIS W/O CONTRAST: CPT

## 2024-10-15 PROCEDURE — G0378 HOSPITAL OBSERVATION PER HR: HCPCS

## 2024-10-15 PROCEDURE — 25010000002 KETOROLAC TROMETHAMINE PER 15 MG: Performed by: NURSE PRACTITIONER

## 2024-10-15 PROCEDURE — 96374 THER/PROPH/DIAG INJ IV PUSH: CPT

## 2024-10-15 PROCEDURE — 83690 ASSAY OF LIPASE: CPT | Performed by: STUDENT IN AN ORGANIZED HEALTH CARE EDUCATION/TRAINING PROGRAM

## 2024-10-15 PROCEDURE — 99222 1ST HOSP IP/OBS MODERATE 55: CPT | Performed by: INTERNAL MEDICINE

## 2024-10-15 PROCEDURE — 25010000002 MORPHINE PER 10 MG: Performed by: INTERNAL MEDICINE

## 2024-10-15 PROCEDURE — 85025 COMPLETE CBC W/AUTO DIFF WBC: CPT | Performed by: STUDENT IN AN ORGANIZED HEALTH CARE EDUCATION/TRAINING PROGRAM

## 2024-10-15 PROCEDURE — 81025 URINE PREGNANCY TEST: CPT | Performed by: INTERNAL MEDICINE

## 2024-10-15 PROCEDURE — 99285 EMERGENCY DEPT VISIT HI MDM: CPT

## 2024-10-15 PROCEDURE — 25010000002 CEFTRIAXONE PER 250 MG: Performed by: INTERNAL MEDICINE

## 2024-10-15 PROCEDURE — 25010000002 MORPHINE PER 10 MG: Performed by: STUDENT IN AN ORGANIZED HEALTH CARE EDUCATION/TRAINING PROGRAM

## 2024-10-15 PROCEDURE — 25010000002 ONDANSETRON PER 1 MG: Performed by: STUDENT IN AN ORGANIZED HEALTH CARE EDUCATION/TRAINING PROGRAM

## 2024-10-15 PROCEDURE — 25010000002 ONDANSETRON PER 1 MG: Performed by: INTERNAL MEDICINE

## 2024-10-15 PROCEDURE — 80053 COMPREHEN METABOLIC PANEL: CPT | Performed by: STUDENT IN AN ORGANIZED HEALTH CARE EDUCATION/TRAINING PROGRAM

## 2024-10-15 PROCEDURE — 96376 TX/PRO/DX INJ SAME DRUG ADON: CPT

## 2024-10-15 PROCEDURE — 25810000003 SODIUM CHLORIDE 0.9 % SOLUTION: Performed by: INTERNAL MEDICINE

## 2024-10-15 PROCEDURE — 99254 IP/OBS CNSLTJ NEW/EST MOD 60: CPT | Performed by: NURSE PRACTITIONER

## 2024-10-15 RX ORDER — POLYETHYLENE GLYCOL 3350 17 G/17G
17 POWDER, FOR SOLUTION ORAL DAILY PRN
Status: DISCONTINUED | OUTPATIENT
Start: 2024-10-15 | End: 2024-10-16 | Stop reason: HOSPADM

## 2024-10-15 RX ORDER — KETOROLAC TROMETHAMINE 30 MG/ML
30 INJECTION, SOLUTION INTRAMUSCULAR; INTRAVENOUS ONCE
Status: COMPLETED | OUTPATIENT
Start: 2024-10-15 | End: 2024-10-15

## 2024-10-15 RX ORDER — BISACODYL 10 MG
10 SUPPOSITORY, RECTAL RECTAL DAILY PRN
Status: DISCONTINUED | OUTPATIENT
Start: 2024-10-15 | End: 2024-10-16 | Stop reason: HOSPADM

## 2024-10-15 RX ORDER — BISACODYL 5 MG/1
5 TABLET, DELAYED RELEASE ORAL DAILY PRN
Status: DISCONTINUED | OUTPATIENT
Start: 2024-10-15 | End: 2024-10-16 | Stop reason: HOSPADM

## 2024-10-15 RX ORDER — CETIRIZINE HYDROCHLORIDE 10 MG/1
10 TABLET ORAL NIGHTLY
Status: DISCONTINUED | OUTPATIENT
Start: 2024-10-15 | End: 2024-10-16 | Stop reason: HOSPADM

## 2024-10-15 RX ORDER — SODIUM CHLORIDE 0.9 % (FLUSH) 0.9 %
10 SYRINGE (ML) INJECTION AS NEEDED
Status: DISCONTINUED | OUTPATIENT
Start: 2024-10-15 | End: 2024-10-16 | Stop reason: HOSPADM

## 2024-10-15 RX ORDER — SODIUM CHLORIDE 9 MG/ML
40 INJECTION, SOLUTION INTRAVENOUS AS NEEDED
Status: ACTIVE | OUTPATIENT
Start: 2024-10-15 | End: 2024-10-16

## 2024-10-15 RX ORDER — ONDANSETRON 2 MG/ML
4 INJECTION INTRAMUSCULAR; INTRAVENOUS EVERY 6 HOURS PRN
Status: DISCONTINUED | OUTPATIENT
Start: 2024-10-15 | End: 2024-10-16 | Stop reason: HOSPADM

## 2024-10-15 RX ORDER — MORPHINE SULFATE 2 MG/ML
2 INJECTION, SOLUTION INTRAMUSCULAR; INTRAVENOUS EVERY 4 HOURS PRN
Status: DISCONTINUED | OUTPATIENT
Start: 2024-10-15 | End: 2024-10-16 | Stop reason: HOSPADM

## 2024-10-15 RX ORDER — ONDANSETRON 2 MG/ML
4 INJECTION INTRAMUSCULAR; INTRAVENOUS ONCE
Status: COMPLETED | OUTPATIENT
Start: 2024-10-15 | End: 2024-10-15

## 2024-10-15 RX ORDER — AMOXICILLIN 250 MG
2 CAPSULE ORAL 2 TIMES DAILY PRN
Status: DISCONTINUED | OUTPATIENT
Start: 2024-10-15 | End: 2024-10-16 | Stop reason: HOSPADM

## 2024-10-15 RX ORDER — NALOXONE HCL 0.4 MG/ML
0.4 VIAL (ML) INJECTION
Status: DISCONTINUED | OUTPATIENT
Start: 2024-10-15 | End: 2024-10-16 | Stop reason: HOSPADM

## 2024-10-15 RX ORDER — ACETAMINOPHEN 325 MG/1
650 TABLET ORAL EVERY 4 HOURS PRN
Status: DISCONTINUED | OUTPATIENT
Start: 2024-10-15 | End: 2024-10-16 | Stop reason: HOSPADM

## 2024-10-15 RX ORDER — LANSOPRAZOLE 30 MG/1
30 TABLET, ORALLY DISINTEGRATING, DELAYED RELEASE ORAL NIGHTLY
Status: DISCONTINUED | OUTPATIENT
Start: 2024-10-15 | End: 2024-10-16 | Stop reason: HOSPADM

## 2024-10-15 RX ORDER — ACETAMINOPHEN 650 MG/1
650 SUPPOSITORY RECTAL EVERY 4 HOURS PRN
Status: DISCONTINUED | OUTPATIENT
Start: 2024-10-15 | End: 2024-10-16 | Stop reason: HOSPADM

## 2024-10-15 RX ORDER — SODIUM CHLORIDE 0.9 % (FLUSH) 0.9 %
10 SYRINGE (ML) INJECTION EVERY 12 HOURS SCHEDULED
Status: DISCONTINUED | OUTPATIENT
Start: 2024-10-15 | End: 2024-10-16 | Stop reason: HOSPADM

## 2024-10-15 RX ORDER — LANSOPRAZOLE 30 MG/1
30 TABLET, ORALLY DISINTEGRATING, DELAYED RELEASE ORAL
Status: DISCONTINUED | OUTPATIENT
Start: 2024-10-16 | End: 2024-10-15

## 2024-10-15 RX ORDER — SODIUM CHLORIDE 9 MG/ML
75 INJECTION, SOLUTION INTRAVENOUS CONTINUOUS
Status: ACTIVE | OUTPATIENT
Start: 2024-10-15 | End: 2024-10-16

## 2024-10-15 RX ORDER — ACETAMINOPHEN 160 MG/5ML
650 SOLUTION ORAL EVERY 4 HOURS PRN
Status: DISCONTINUED | OUTPATIENT
Start: 2024-10-15 | End: 2024-10-16 | Stop reason: HOSPADM

## 2024-10-15 RX ORDER — CETIRIZINE HYDROCHLORIDE 10 MG/1
10 TABLET ORAL DAILY
Status: DISCONTINUED | OUTPATIENT
Start: 2024-10-15 | End: 2024-10-15

## 2024-10-15 RX ADMIN — LANSOPRAZOLE 30 MG: 30 TABLET, ORALLY DISINTEGRATING ORAL at 20:36

## 2024-10-15 RX ADMIN — SODIUM CHLORIDE 2000 MG: 9 INJECTION, SOLUTION INTRAVENOUS at 16:57

## 2024-10-15 RX ADMIN — SODIUM CHLORIDE 75 ML/HR: 9 INJECTION, SOLUTION INTRAVENOUS at 14:09

## 2024-10-15 RX ADMIN — MORPHINE SULFATE 4 MG: 4 INJECTION, SOLUTION INTRAMUSCULAR; INTRAVENOUS at 10:50

## 2024-10-15 RX ADMIN — Medication 5 MG: at 23:53

## 2024-10-15 RX ADMIN — CETIRIZINE HYDROCHLORIDE 10 MG: 10 TABLET, FILM COATED ORAL at 20:36

## 2024-10-15 RX ADMIN — Medication 10 ML: at 14:11

## 2024-10-15 RX ADMIN — MORPHINE SULFATE 2 MG: 2 INJECTION, SOLUTION INTRAMUSCULAR; INTRAVENOUS at 20:36

## 2024-10-15 RX ADMIN — KETOROLAC TROMETHAMINE 30 MG: 30 INJECTION, SOLUTION INTRAMUSCULAR; INTRAVENOUS at 11:47

## 2024-10-15 RX ADMIN — ONDANSETRON 4 MG: 2 INJECTION INTRAMUSCULAR; INTRAVENOUS at 10:49

## 2024-10-15 RX ADMIN — ONDANSETRON 4 MG: 2 INJECTION INTRAMUSCULAR; INTRAVENOUS at 20:36

## 2024-10-15 RX ADMIN — Medication 10 ML: at 20:36

## 2024-10-15 NOTE — H&P
[facility]      Name:  Carolyn Theodore   Age:  34 y.o.  Sex:  female  :  1990  MRN:  [id number]   Visit Number:  [id number]  Admission Date:  10/15/2024  Date Of Service:  10/15/24  Primary Care Physician:  Clement Velasco DO    Chief Complaint:     Kidney stone    History Of Presenting Illness:      Patient is a morbidly obese 34-year-old female with history significant for hypertension, depression and anxiety who presents to the emergency room with worsening right flank pain.  Patient already had known kidney stone for which she was seen by urology outpatient.  Planning to schedule intervention.  However, pain became so severe prompting patient to present to the emergency room.  She admits to nausea without vomiting.  No fever/chills.    ED summary: Patient afebrile, hypertensive, nonhypoxic on room air.  CMP unremarkable.  CBC with mild leukocytosis of 11.71.  UA negative for nitrites.  Trace leukocytes and bacteria.  CT abdomen pelvis revealed mild right hydronephrosis secondary to a 7 mm right UPJ stone.  Patient received morphine and Zofran.  Dr. Burton agreed to consult hospitalist asked to admit.    Review Of Systems:    All systems were reviewed and negative except as mentioned in history of presenting illness, assessment and plan.    Past Medical History: Patient  has a past medical history of Abnormal liver enzymes, Asthma, Depression, Eosinophilia, Extreme obesity with alveolar hypoventilation (2016), GERD (gastroesophageal reflux disease), Hypertension, Morbid obesity, Obesity, Peripheral edema (2016), Renal calculi, and Sleep apnea.    Past Surgical History: Patient  has a past surgical history that includes Cholecystectomy; Kidney stone surgery; Gastric Sleeve;  section; and Tonsillectomy.    Social History: Patient  reports that she has never smoked. She has never been exposed to tobacco smoke. She has never used smokeless  tobacco. She reports that she does not drink alcohol and does not use drugs.    Family History:  Patient's family history has been reviewed and found to be noncontributory.     Allergies:      Doxycycline, Nsaids, Amoxicillin, and Penicillins    Home Medications:    Prior to Admission Medications       Prescriptions Last Dose Informant Patient Reported? Taking?    albuterol (PROVENTIL) (2.5 MG/3ML) 0.083% nebulizer solution   No No    Take 2.5 mg by nebulization Every 4 (Four) Hours As Needed for Wheezing.    Albuterol-Budesonide 90-80 MCG/ACT aerosol   No No    Inhale 2 Inhalations Every 2 (Two) Hours As Needed (cough, shortness of breath, wheezing).    Ascorbic Acid (Vitamin C) 500 MG capsule   Yes No    Take  by mouth.    Biotin 5 MG capsule   Yes No    Take  by mouth.    Budeson-Glycopyrrol-Formoterol (BREZTRI) 160-9-4.8 MCG/ACT aerosol inhaler   No No    Inhale 2 puffs 2 (Two) Times a Day.    esomeprazole (nexIUM) 20 MG capsule   Yes No    Take 1 capsule by mouth Every Morning Before Breakfast.    lisdexamfetamine (Vyvanse) 20 MG capsule   No No    Take 1 capsule by mouth Every Morning    loratadine (CLARITIN) 10 MG tablet   No No    TAKE ONE TABLET BY MOUTH EVERY DAY    multivitamin with minerals tablet tablet   Yes No    Take 1 tablet by mouth Daily.    oxyCODONE (Roxicodone) 5 MG immediate release tablet   No No    Take 1 tablet by mouth Every 6 (Six) Hours As Needed for Severe Pain for up to 3 days.    predniSONE (DELTASONE) 20 MG tablet   No No    3 po daily for 3 days, 2 po daily for 3 days, 1 po daily for 3 days    vitamin D3 125 MCG (5000 UT) capsule capsule   Yes No    Take 1 capsule by mouth Daily.          ED Medications:    Medications   sodium chloride 0.9 % flush 10 mL (has no administration in time range)   morphine injection 4 mg (4 mg Intravenous Given 10/15/24 1050)   ondansetron (ZOFRAN) injection 4 mg (4 mg Intravenous Given 10/15/24 1049)   ketorolac (TORADOL) injection 30 mg (30 mg  "Intravenous Given 10/15/24 1147)     Vital Signs:  Temp:  [98.2 °F (36.8 °C)] 98.2 °F (36.8 °C)  Heart Rate:  [78] 78  Resp:  [14] 14  BP: (145-194)/() 169/87        10/15/24  1002   Weight: (!) 144 kg (318 lb)     Body mass index is 58.16 kg/m².    Physical Exam:     Most recent vital Signs: /87   Pulse 78   Temp 98.2 °F (36.8 °C)   Resp 14   Ht 157.5 cm (62\")   Wt (!) 144 kg (318 lb)   LMP 09/28/2024 (Approximate)   SpO2 99%   BMI 58.16 kg/m²     Physical Exam  Vitals reviewed.   Constitutional:       General: She is not in acute distress.     Appearance: She is obese.   HENT:      Head: Normocephalic and atraumatic.      Right Ear: External ear normal.      Left Ear: External ear normal.      Mouth/Throat:      Mouth: Mucous membranes are moist.      Pharynx: Oropharynx is clear.   Eyes:      Extraocular Movements: Extraocular movements intact.      Conjunctiva/sclera: Conjunctivae normal.   Cardiovascular:      Rate and Rhythm: Normal rate and regular rhythm.      Pulses: Normal pulses.      Heart sounds: Normal heart sounds.   Pulmonary:      Effort: Pulmonary effort is normal.      Breath sounds: Normal breath sounds.   Abdominal:      General: Bowel sounds are normal.      Palpations: Abdomen is soft.      Tenderness: There is right CVA tenderness.   Musculoskeletal:      Right lower leg: No edema.      Left lower leg: No edema.   Skin:     General: Skin is warm and dry.   Neurological:      General: No focal deficit present.      Mental Status: She is alert and oriented to person, place, and time.         Laboratory data:    I have reviewed the labs done in the emergency room.    Results from last 7 days   Lab Units 10/15/24  1049 10/12/24  1718   SODIUM mmol/L 141 135*   POTASSIUM mmol/L 3.9 3.9   CHLORIDE mmol/L 104 101   CO2 mmol/L 25.9 24.2   BUN mg/dL 11 9   CREATININE mg/dL 0.99 1.21*   CALCIUM mg/dL 9.3 9.1   BILIRUBIN mg/dL 0.5 0.7   ALK PHOS U/L 96 92   ALT (SGPT) U/L 16 16   AST " (SGOT) U/L 18 16   GLUCOSE mg/dL 97 103*     Results from last 7 days   Lab Units 10/15/24  1010 10/12/24  1659   WBC 10*3/mm3 11.71* 16.02*   HEMOGLOBIN g/dL 11.8* 12.0   HEMATOCRIT % 38.6 38.6   PLATELETS 10*3/mm3 352 348                     Results from last 7 days   Lab Units 10/15/24  1049   LIPASE U/L 21         Results from last 7 days   Lab Units 10/15/24  1021 10/14/24  0849   COLOR UA  Yellow Yellow   SPECIFIC GRAVITY, URINE   --  1.020   GLUCOSE UA  Negative  --    KETONES UA  Negative Negative   BLOOD UA  Negative  --    LEUKOCYTES UA  Trace* Small (1+)*   PH, URINE  7.0 6.0   BILIRUBIN UA  Negative Negative   UROBILINOGEN UA  0.2 E.U./dL 0.2 E.U./dL   RBC UA /HPF 0-2  --    WBC UA /HPF 0-2  --        Pain Management Panel           No data to display                EKG:          Radiology:    CT Abdomen Pelvis Without Contrast    Result Date: 10/15/2024  PROCEDURE: CT ABDOMEN PELVIS WO CONTRAST-  HISTORY: Right renal stone  COMPARISON: 2/2015.  PROCEDURE: Axial images were obtained from the lung bases through the pubic symphysis without intravenous contrast.  FINDINGS:  ABDOMEN: The lung bases are clear. The heart size is normal. There are postsurgical changes of prior gastric sleeve. The limited noncontrast images of the liver are normal. The gallbladder is surgically absent. The spleen is normal. No adrenal masses are seen.  The pancreas has an unremarkable unenhanced appearance. The aorta is normal in caliber. A new supraumbilical hernia is seen containing fat measuring up to 4 cm. There is a stable small umbilical hernia containing fat. There is no significant free fluid or adenopathy. There is mild right hydronephrosis secondary to a 7 mm right UPJ stone. There is minimal left nephrolithiasis. Limited noncontrast images of the bowel are unremarkable.  PELVIS: The appendix is not identified. Uterus and ovaries are unremarkable. The urinary bladder is unremarkable. There is no significant pelvic free  fluid, mass, or adenopathy.      Mild right hydronephrosis secondary to a 7 mm right UPJ stone.     CTDI: 23.56 mGy DLP: 1195.95 mGy.cm   This study was performed with techniques to keep radiation doses as low as reasonably achievable (ALARA). Individualized dose reduction techniques using automated exposure control or adjustment of mA and/or kV according to the patient size were employed.     Images were reviewed, interpreted, and dictated by Dr. Ralph Thomas MD Transcribed by Courtney Metcalf PA-C.  This report was signed and finalized on 10/15/2024 12:02 PM by Ralph Thomas MD.       Assessment:    Right kidney stone   Hypertension  Depression/anxiety  Morbid obesity    Plan:    Right kidney stone  -Dr. Burton with urology consulted.  -Empiric Rocephin  -Pain control and antiemetics as needed  -IV fluids  -N.p.o. after midnight    Risk Assessment: Moderate  DVT Prophylaxis: SCD  Code Status: Full  Diet: NPO after midnight           Mich Beckford DO  10/15/24  13:15 EDT    Dictated utilizing Dragon dictation.

## 2024-10-15 NOTE — PLAN OF CARE
Goal Outcome Evaluation:   No acute events this shift and VSS. Gave IV antibiotics and surgery planned for tomorrow. Will continue to monitor patient condition

## 2024-10-15 NOTE — CASE MANAGEMENT/SOCIAL WORK
Discharge Planning Assessment  [facility]     Patient Name: Carolyn Theodore  MRN: [id number]  Today's Date: 10/15/2024    Admit Date: 10/15/2024    Plan: The patient is awake and able to answer questions.  She is a current patient of Dr. Velasco and gets her medications from [facility].  She elects to enroll in Meds to Bed.  She does not use DME.  She denies the need for DME or services at [place].  The patient currently vapes nicotine; she denies need for nicotine cessation information. She is currently living with her parents since her recent seperation from her .  At the time of DC the patient plans to return home with her parents.  Questions and concerns were addressed at the time of this conversation.  The patient does not have insurace, but has met with [employer].  Will provide additional resoruces and information upon patient request.   Discharge Needs Assessment       Row Name 10/15/24 1329       Living Environment    People in Home parent(s)    Name(s) of People in Home Currently living with parents    Unique Family Situation Seperated from her  less than 1 month ago    Current Living Arrangements apartment    Duration at Residence 3 weeks    Potentially Unsafe Housing Conditions none    In the past 12 months has the electric, gas, oil, or water company threatened to shut off services in your home? No    Primary Care Provided by self    Provides Primary Care For no one    Family Caregiver if Needed none    Quality of Family Relationships helpful;involved;supportive    Able to Return to Prior Arrangements yes    Living Arrangement Comments Living with parents since recently seperated from spouse       Resource/Environmental Concerns    Resource/Environmental Concerns none    Transportation Concerns none       Transportation Needs    In the past 12 months, has lack of transportation kept you from medical appointments or from getting medications? no    In the past 12 months, has lack of  transportation kept you from meetings, work, or from getting things needed for daily living? No       Food Insecurity    Within the past 12 months, you worried that your food would run out before you got the money to buy more. Never true    Within the past 12 months, the food you bought just didn't last and you didn't have money to get more. Never true       Transition Planning    Patient/Family Anticipates Transition to home with family    Patient/Family Anticipated Services at Transition none    Transportation Anticipated family or friend will provide       Discharge Needs Assessment    Readmission Within the Last 30 Days no previous admission in last 30 days    Equipment Currently Used at Home none    Concerns to be Addressed denies needs/concerns at this time    Do you want help finding or keeping work or a job? I do not need or want help    Do you want help with school or training? For example, starting or completing job training or getting a high school diploma, GED or equivalent No    Anticipated Changes Related to Illness none    Equipment Needed After Discharge none    Provided Post Acute Provider List? N/A    N/A Provider List Comment Patient plans to return home; no new needs    Provided Post Acute Provider Quality & Resource List? N/A    N/A Quality & Resource List Comment Patient plans to return home; no new needs    Offered/Gave Vendor List no                   Discharge Plan       Row Name 10/15/24 7215       Plan    Plan The patient is awake and able to answer questions.  She is a current patient of Dr. Velasco and gets her medications from [facility].  She elects to enroll in Meds to Bed.  She does not use DME.  She denies the need for DME or services at [place].  The patient currently vapes nicotine; she denies need for nicotine cessation information. She is currently living with her parents since her recent seperation from her .  At the time of [place] the patient plans to return home with her parents.   Questions and concerns were addressed at the time of this conversation.  The patient does not have insurace, but has met with [employer].  Will provide additional resoruces and information upon patient request.    Patient/Family in Agreement with Plan yes    Provided Post Acute Provider List? N/A    N/A Provider List Comment Patient plans to return home; no new needs    Provided Post Acute Provider Quality & Resource List? N/A    N/A Quality & Resource List Comment Patient plans to return home; no new needs    Plan Comments Patient denies needs at this time    Final Discharge Disposition Code 01 - home or self-care    Final Note Patient plans to return home with parents                  Continued Care and Services - Admitted Since 10/15/2024    No active coordination exists for this encounter.          Demographic Summary       Row Name 10/15/24 1325       General Information    Admission Type observation    Arrived From emergency department    Referral Source admission list    Reason for Consult discharge planning    Preferred Language English       Contact Information    Permission Granted to Share Info With                    Functional Status       Row Name 10/15/24 1328       Functional Status    Usual Activity Tolerance excellent    Current Activity Tolerance good       Physical Activity    On average, how many days per week do you engage in moderate to strenuous exercise (like a brisk walk)? 0 days    On average, how many minutes do you engage in exercise at this level? 0 min    Number of minutes of exercise per week 0       Assessment of Health Literacy    How often do you have someone help you read hospital materials? Never    How often do you have problems learning about your medical condition because of difficulty understanding written information? Never    How often do you have a problem understanding what is told to you about your medical condition? Never    How confident are you filling  out medical forms by yourself? Extremely    Health Literacy Excellent       Functional Status, IADL    Medications independent    Meal Preparation independent    Housekeeping independent    Laundry independent    Shopping independent    If for any reason you need help with day-to-day activities such as bathing, preparing meals, shopping, managing finances, etc., do you get the help you need? I don't need any help       Mental Status    General Appearance WDL WDL       Mental Status Summary    Recent Changes in Mental Status/Cognitive Functioning no changes       Employment/    Employment Status employed full-time    Shift Worked first shift    Current or Previous Occupation healthcare                   Psychosocial       Row Name 10/15/24 1329       Values/Beliefs    Spiritual, Cultural Beliefs, Nondenominational Practices, Values that Affect Care no       Behavior WDL    Behavior WDL WDL       Emotion Mood WDL    Emotion/Mood/Affect WDL WDL       Speech WDL    Speech WDL WDL       Perceptual State WDL    Perceptual State WDL WDL       Thought Process WDL    Thought Process WDL WDL       Intellectual Performance WDL    Intellectual Performance WDL WDL                   Abuse/Neglect       Row Name 10/15/24 1329       Personal Safety    Feels Unsafe at Home or Work/School no    Feels Threatened by Someone no    Does Anyone Try to Keep You From Having Contact with Others or Doing Things Outside Your Home? no    Physical Signs of Abuse Present no                   Legal       Row Name 10/15/24 1329       Financial Resource Strain    How hard is it for you to pay for the very basics like food, housing, medical care, and heating? Not hard                   Substance Abuse       Row Name 10/15/24 1329       Substance Use    Substance Use Status other (see comments)  current vape use                   Patient Forms    No documentation.                     Mela Whatley RN

## 2024-10-15 NOTE — CONSULTS
In Patient Consult      Patient Name: Carolyn Theodore  : 1990   MRN: [id number]   Admission Date: 10/15/2024 10:04 AM     Admission Diagnosis: right nephrolithiasis    Care Team: Patient Care Team:  Clement Velasco DO as PCP - General (Family Medicine)    History of Present Illness: Carolyn Theodore is a 34 y.o. female who urology was consulted to see for nephrolithiasis.  History of morbid obesity, GERD, depression, anxiety, and asthma.      Patient was seen as new patient consult in office yesterday for right nephrolithiasis.  Patient was in [place] and presented to ED on 10/10/24 with right flank pain. CT confirmed right renal stone with hydronephrosis. She was discharged with toradol, zofran, and flomax. She returned to the ED on 10/12/24 with similar complaints and was discharged on oxycodone with tylenol.  Patient was consented at visit for right URS/LL with stent insertion.  Advised to stop oxycodone-acetaminophen and toradol.  Started on oxycodone 5 mg PRN and tylenol 1000 mg every 6 hours.      Patient presented to ED today with worsening right flank pain, nausea, decreased oral intake.  CT confirmed 6.7 mm right renal pelvis stone with hydronephrosis.  She was admitted for further management.      Subjective      Review of System: As noted in HPI.    Past Medical History:   Past Medical History:   Diagnosis Date    Abnormal liver enzymes     Asthma     Positive methacholine challenge test.  Frequent bronchitis.    Depression     Eosinophilia     Resolved in the past once off of Singulair.    Extreme obesity with alveolar hypoventilation 2016    GERD (gastroesophageal reflux disease)     w/o esophagitis    Hypertension     Morbid obesity     Obesity      Negative sleep study in the past, no nocturnal hypoxemia on the study    Peripheral edema 2016    Renal calculi     Sleep apnea        Past Surgical History:   Past Surgical History:   Procedure Laterality Date      SECTION      CHOLECYSTECTOMY      GASTRIC SLEEVE LAPAROSCOPIC      KIDNEY STONE SURGERY      TONSILLECTOMY         Family History:   Family History   Problem Relation Age of Onset    Hypertension Father     Diabetes Father     Alcohol abuse Other     Cancer Other     Hypertension Other     Hypertension Other        Social History:   Social History     Socioeconomic History    Marital status:    Tobacco Use    Smoking status: Never     Passive exposure: Never    Smokeless tobacco: Never   Vaping Use    Vaping status: Every Day    Substances: Nicotine, Flavoring   Substance and Sexual Activity    Alcohol use: No    Drug use: No    Sexual activity: Yes     Partners: Male     Birth control/protection: None       Medications:     Current Facility-Administered Medications:     acetaminophen (TYLENOL) tablet 650 mg, 650 mg, Oral, Q4H PRN **OR** acetaminophen (TYLENOL) 160 MG/5ML oral solution 650 mg, 650 mg, Oral, Q4H PRN **OR** acetaminophen (TYLENOL) suppository 650 mg, 650 mg, Rectal, Q4H PRN, Mich Beckford DO    sennosides-docusate (PERICOLACE) 8.6-50 MG per tablet 2 tablet, 2 tablet, Oral, BID PRN **AND** polyethylene glycol (MIRALAX) packet 17 g, 17 g, Oral, Daily PRN **AND** bisacodyl (DULCOLAX) EC tablet 5 mg, 5 mg, Oral, Daily PRN **AND** bisacodyl (DULCOLAX) suppository 10 mg, 10 mg, Rectal, Daily PRN, Mich Beckford DO    Calcium Replacement - Follow Nurse / BPA Driven Protocol, , Does not apply, PRN, Mich Beckford DO    cefTRIAXone (ROCEPHIN) 2,000 mg in sodium chloride 0.9 % 100 mL IVPB-VTB, 2,000 mg, Intravenous, Q24H, Mich Beckford DO, Last Rate: 200 mL/hr at 10/15/24 1657, 2,000 mg at 10/15/24 1657    cetirizine (zyrTEC) tablet 10 mg, 10 mg, Oral, Nightly, Mich Beckford DO    lansoprazole (PREVACID SOLUTAB) disintegrating tablet Tablet Delayed Release Dispersible 30 mg, 30 mg, Oral, Nightly, Mich Beckford DO    Magnesium Standard Dose Replacement - Follow Nurse / BPA Driven Protocol,  , Does not apply, PRN, Mich Beckford DO    melatonin tablet 5 mg, 5 mg, Oral, Nightly PRN, Mich Beckford DO    Morphine sulfate (PF) injection 2 mg, 2 mg, Intravenous, Q4H PRN **AND** naloxone (NARCAN) injection 0.4 mg, 0.4 mg, Intravenous, Q5 Min PRN, Mich Beckford DO    ondansetron (ZOFRAN) injection 4 mg, 4 mg, Intravenous, Q6H PRN, Mich Beckford DO    Phosphorus Replacement - Follow Nurse / BPA Driven Protocol, , Does not apply, PRN, Mich Beckford DO    Potassium Replacement - Follow Nurse / BPA Driven Protocol, , Does not apply, PRN, Mich Beckford DO    sodium chloride 0.9 % flush 10 mL, 10 mL, Intravenous, PRN, [facility],     sodium chloride 0.9 % flush 10 mL, 10 mL, Intravenous, Q12H, Mich Beckford DO, 10 mL at 10/15/24 1411    sodium chloride 0.9 % flush 10 mL, 10 mL, Intravenous, PRN, Mich Beckford DO    sodium chloride 0.9 % infusion 40 mL, 40 mL, Intravenous, PRN, Mich Beckford,     sodium chloride 0.9 % infusion, 75 mL/hr, Intravenous, Continuous, Mich Beckford DO, Last Rate: 75 mL/hr at 10/15/24 1409, 75 mL/hr at 10/15/24 1409    Allergies:   Allergies   Allergen Reactions    Doxycycline Nausea Only    Nsaids     Amoxicillin Rash    Penicillins Rash       Patient Active Problem List   Diagnosis    Mild persistent asthma without complication    Depression with anxiety    Impaired glucose tolerance    Gastroesophageal reflux disease without esophagitis    Class 3 severe obesity due to excess calories with serious comorbidity and body mass index (BMI) of 50.0 to 59.9 in adult    Leukocytosis    Elevated sed rate (elev SR)    Persistent migraine aura without cerebral infarction and without status migrainosus, not intractable    Binge eating    Sleep apnea in adult    Post-COVID-19 syndrome manifesting as chronic cough    Elevated BP without diagnosis of hypertension    Kidney stone    Hydronephrosis with urinary obstruction due to renal calculus       Objective  "    Physical Exam:   Vital Signs:   Vitals:    10/15/24 1220 10/15/24 1326 10/15/24 1441 10/15/24 1539   BP: 169/87 (!) 173/119 139/68 159/93   BP Location:  Left arm  Left arm   Patient Position:  Lying  Sitting   Pulse:  73  68   Resp:  14  14   Temp:  97.8 °F (36.6 °C)  98 °F (36.7 °C)   TempSrc:  Oral  Oral   SpO2: 99% 100%  97%   Weight:  (!) 144 kg (318 lb)     Height:  157.5 cm (62\")       Body mass index is 58.16 kg/m².   Physical Exam  Vitals and nursing note reviewed.   Constitutional:       General: She is awake. She is not in acute distress.     Appearance: She is morbidly obese.   Pulmonary:      Effort: Pulmonary effort is normal.   Neurological:      Mental Status: She is alert and oriented to person, place, and time. Mental status is at baseline.   Psychiatric:         Mood and Affect: Mood normal.         Behavior: Behavior is cooperative.            Labs:   No intake/output data recorded.    Lab Results   Component Value Date    GLUCOSE 97 10/15/2024    CALCIUM 9.3 10/15/2024     10/15/2024    K 3.9 10/15/2024    CO2 25.9 10/15/2024     10/15/2024    BUN 11 10/15/2024    CREATININE 0.99 10/15/2024    EGFRIFAFRI 111 07/28/2021    EGFRIFNONA 96 07/28/2021    BCR 11.1 10/15/2024    ANIONGAP 11.1 10/15/2024       Lab Results   Component Value Date    WBC 11.71 (H) 10/15/2024    HGB 11.8 (L) 10/15/2024    HCT 38.6 10/15/2024    MCV 78.0 (L) 10/15/2024     10/15/2024       No results found for: \"URINECX\"    Brief Urine Lab Results  (Last result in the past 365 days)        Color   Clarity   Blood   Leuk Est   Nitrite   Protein   CREAT   Urine HCG        10/15/24 1021 Yellow   Clear   Negative   Trace   Negative   Negative                   Radiographic Studies  CT Abdomen Pelvis Without Contrast    Result Date: 10/15/2024  Mild right hydronephrosis secondary to a 7 mm right UPJ stone.     CTDI: 23.56 mGy DLP: 1195.95 mGy.cm   This study was performed with techniques to keep radiation " doses as low as reasonably achievable (ALARA). Individualized dose reduction techniques using automated exposure control or adjustment of mA and/or kV according to the patient size were employed.     Images were reviewed, interpreted, and dictated by Dr. Ralph Thomas MD Transcribed by Courtney Metcalf PA-C.  This report was signed and finalized on 10/15/2024 12:02 PM by Ralph Thomas MD.      CT Abdomen Pelvis With Contrast    Result Date: 10/11/2024  1.    Mild right hydronephrosis proximal to an 8 mm calculus in the renal pelvis. Mild reactive edema. 2.     Additional tiny bilateral renal calculi. 3.    Chronic and incidental findings as above. Dictated By:    - 10/11/2024 12:51 AM EDT Electronically Signed By:  Ralph Abraham MD - 10/11/2024 12:55 AM EDT     I have reviewed the above labs and imaging.    Assessment / Plan      Assessment:    34 y.o. female presents to ED today with worsening right flank pain, nausea, decreased oral intake.  Patient was seen as new patient consult in office yesterday for right nephrolithiasis.  Patient was in [place] and presented to ED on 10/10/24 with right flank pain. CT confirmed right renal stone with hydronephrosis. She was discharged with toradol, zofran, and flomax. She returned to the ED on 10/12/24 with similar complaints and was discharged on oxycodone with tylenol.  Patient was consented at visit for right URS/LL with stent insertion.  Advised to stop oxycodone-acetaminophen and toradol.  Started on oxycodone 5 mg PRN and tylenol 1000 mg every 6 hours.      CT obtained today confirmed 6.7 mm right renal pelvis stone with hydronephrosis.  Renal function is back to baseline.  Pain well controlled on current regimen.  Denies any dysuria with UA showing contamination, no infection noted.  After discussion with Dr. Burton recommend right URS/LL with stent and stent removal in 1 week in office.  Discussed expectations of stent, patient has had stent in the passed.  We  discussed the risks including but not limited to bleeding, infection, damage nearby structures, need for further surgeries, and complications with anesthesia.  Patient consented and wishes to proceed with surgery.  NPO after midnight.  Patient risks include: BMI, GERD, asthma.  Report she was tested in 2017 for PAM and did not have.      Plan:  NPO after midnight  OR tomorrow for right URS/LL with stent insertion  Patient will need outpatient follow up in 1 week with Dr. Burton for stent removal        JEAN Mclean   [facility]     Assessment and plan were discussed with attending Dr. Burton prior to signature of this note

## 2024-10-16 ENCOUNTER — ANESTHESIA EVENT (OUTPATIENT)
Dept: PERIOP | Facility: HOSPITAL | Age: 34
End: 2024-10-16
Payer: MEDICAID

## 2024-10-16 ENCOUNTER — ANESTHESIA (OUTPATIENT)
Dept: PERIOP | Facility: HOSPITAL | Age: 34
End: 2024-10-16
Payer: MEDICAID

## 2024-10-16 ENCOUNTER — READMISSION MANAGEMENT (OUTPATIENT)
Dept: CALL CENTER | Facility: HOSPITAL | Age: 34
End: 2024-10-16
Payer: MEDICAID

## 2024-10-16 VITALS
WEIGHT: 293 LBS | HEIGHT: 62 IN | HEART RATE: 78 BPM | TEMPERATURE: 97.6 F | OXYGEN SATURATION: 93 % | RESPIRATION RATE: 18 BRPM | SYSTOLIC BLOOD PRESSURE: 158 MMHG | BODY MASS INDEX: 53.92 KG/M2 | DIASTOLIC BLOOD PRESSURE: 94 MMHG

## 2024-10-16 LAB
ANION GAP SERPL CALCULATED.3IONS-SCNC: 11.4 MMOL/L (ref 5–15)
B-HCG UR QL: NEGATIVE
BASOPHILS # BLD AUTO: 0.09 10*3/MM3 (ref 0–0.2)
BASOPHILS NFR BLD AUTO: 0.8 % (ref 0–1.5)
BUN SERPL-MCNC: 14 MG/DL (ref 6–20)
BUN/CREAT SERPL: 13.2 (ref 7–25)
CALCIUM SPEC-SCNC: 8.5 MG/DL (ref 8.6–10.5)
CHLORIDE SERPL-SCNC: 105 MMOL/L (ref 98–107)
CO2 SERPL-SCNC: 24.6 MMOL/L (ref 22–29)
CREAT SERPL-MCNC: 1.06 MG/DL (ref 0.57–1)
DEPRECATED RDW RBC AUTO: 41.5 FL (ref 37–54)
EGFRCR SERPLBLD CKD-EPI 2021: 70 ML/MIN/1.73
EOSINOPHIL # BLD AUTO: 0.04 10*3/MM3 (ref 0–0.4)
EOSINOPHIL NFR BLD AUTO: 0.4 % (ref 0.3–6.2)
ERYTHROCYTE [DISTWIDTH] IN BLOOD BY AUTOMATED COUNT: 14.6 % (ref 12.3–15.4)
GLUCOSE SERPL-MCNC: 107 MG/DL (ref 65–99)
HCT VFR BLD AUTO: 34.5 % (ref 34–46.6)
HGB BLD-MCNC: 10.4 G/DL (ref 12–15.9)
IMM GRANULOCYTES # BLD AUTO: 0.09 10*3/MM3 (ref 0–0.05)
IMM GRANULOCYTES NFR BLD AUTO: 0.8 % (ref 0–0.5)
LYMPHOCYTES # BLD AUTO: 2.71 10*3/MM3 (ref 0.7–3.1)
LYMPHOCYTES NFR BLD AUTO: 25.2 % (ref 19.6–45.3)
MCH RBC QN AUTO: 23.7 PG (ref 26.6–33)
MCHC RBC AUTO-ENTMCNC: 30.1 G/DL (ref 31.5–35.7)
MCV RBC AUTO: 78.6 FL (ref 79–97)
MONOCYTES # BLD AUTO: 0.63 10*3/MM3 (ref 0.1–0.9)
MONOCYTES NFR BLD AUTO: 5.8 % (ref 5–12)
NEUTROPHILS NFR BLD AUTO: 67 % (ref 42.7–76)
NEUTROPHILS NFR BLD AUTO: 7.21 10*3/MM3 (ref 1.7–7)
NRBC BLD AUTO-RTO: 0 /100 WBC (ref 0–0.2)
PLATELET # BLD AUTO: 287 10*3/MM3 (ref 140–450)
PMV BLD AUTO: 10.1 FL (ref 6–12)
POTASSIUM SERPL-SCNC: 3.9 MMOL/L (ref 3.5–5.2)
RBC # BLD AUTO: 4.39 10*6/MM3 (ref 3.77–5.28)
SODIUM SERPL-SCNC: 141 MMOL/L (ref 136–145)
WBC NRBC COR # BLD AUTO: 10.77 10*3/MM3 (ref 3.4–10.8)

## 2024-10-16 PROCEDURE — 96376 TX/PRO/DX INJ SAME DRUG ADON: CPT

## 2024-10-16 PROCEDURE — 25010000002 LIDOCAINE (CARDIAC): Performed by: NURSE ANESTHETIST, CERTIFIED REGISTERED

## 2024-10-16 PROCEDURE — G0378 HOSPITAL OBSERVATION PER HR: HCPCS

## 2024-10-16 PROCEDURE — 52356 CYSTO/URETERO W/LITHOTRIPSY: CPT | Performed by: UROLOGY

## 2024-10-16 PROCEDURE — 25010000002 MIDAZOLAM PER 1MG: Performed by: NURSE ANESTHETIST, CERTIFIED REGISTERED

## 2024-10-16 PROCEDURE — 25810000003 LACTATED RINGERS PER 1000 ML: Performed by: NURSE PRACTITIONER

## 2024-10-16 PROCEDURE — 25010000002 DEXAMETHASONE PER 1 MG: Performed by: NURSE ANESTHETIST, CERTIFIED REGISTERED

## 2024-10-16 PROCEDURE — 80048 BASIC METABOLIC PNL TOTAL CA: CPT | Performed by: INTERNAL MEDICINE

## 2024-10-16 PROCEDURE — 25010000002 ONDANSETRON PER 1 MG: Performed by: NURSE ANESTHETIST, CERTIFIED REGISTERED

## 2024-10-16 PROCEDURE — 85025 COMPLETE CBC W/AUTO DIFF WBC: CPT | Performed by: INTERNAL MEDICINE

## 2024-10-16 PROCEDURE — 25010000002 FENTANYL CITRATE (PF) 50 MCG/ML SOLUTION PREFILLED SYRINGE: Performed by: NURSE ANESTHETIST, CERTIFIED REGISTERED

## 2024-10-16 PROCEDURE — C1769 GUIDE WIRE: HCPCS | Performed by: UROLOGY

## 2024-10-16 PROCEDURE — 25010000002 MORPHINE PER 10 MG: Performed by: INTERNAL MEDICINE

## 2024-10-16 PROCEDURE — 99239 HOSP IP/OBS DSCHRG MGMT >30: CPT | Performed by: INTERNAL MEDICINE

## 2024-10-16 PROCEDURE — 74420 UROGRAPHY RTRGR +-KUB: CPT | Performed by: UROLOGY

## 2024-10-16 PROCEDURE — S0260 H&P FOR SURGERY: HCPCS | Performed by: UROLOGY

## 2024-10-16 PROCEDURE — 25010000002 SUGAMMADEX 500 MG/5ML SOLUTION: Performed by: NURSE ANESTHETIST, CERTIFIED REGISTERED

## 2024-10-16 PROCEDURE — C1894 INTRO/SHEATH, NON-LASER: HCPCS | Performed by: UROLOGY

## 2024-10-16 PROCEDURE — C1758 CATHETER, URETERAL: HCPCS | Performed by: UROLOGY

## 2024-10-16 PROCEDURE — C2617 STENT, NON-COR, TEM W/O DEL: HCPCS | Performed by: UROLOGY

## 2024-10-16 PROCEDURE — 25010000002 ESMOLOL 100 MG/10ML SOLUTION: Performed by: NURSE ANESTHETIST, CERTIFIED REGISTERED

## 2024-10-16 PROCEDURE — 25510000001 IOPAMIDOL 61 % SOLUTION: Performed by: UROLOGY

## 2024-10-16 PROCEDURE — 25010000002 CEFAZOLIN PER 500 MG: Performed by: UROLOGY

## 2024-10-16 PROCEDURE — 25010000002 PROPOFOL 10 MG/ML EMULSION: Performed by: NURSE ANESTHETIST, CERTIFIED REGISTERED

## 2024-10-16 DEVICE — URETERAL STENT
Type: IMPLANTABLE DEVICE | Site: URETER | Status: FUNCTIONAL
Brand: CONTOUR™

## 2024-10-16 RX ORDER — ONDANSETRON 2 MG/ML
INJECTION INTRAMUSCULAR; INTRAVENOUS AS NEEDED
Status: DISCONTINUED | OUTPATIENT
Start: 2024-10-16 | End: 2024-10-16 | Stop reason: SURG

## 2024-10-16 RX ORDER — ACETAMINOPHEN 500 MG
1000 TABLET ORAL EVERY 6 HOURS
Qty: 30 TABLET | Refills: 0 | Status: SHIPPED | OUTPATIENT
Start: 2024-10-16 | End: 2024-10-20

## 2024-10-16 RX ORDER — PROCHLORPERAZINE EDISYLATE 5 MG/ML
10 INJECTION INTRAMUSCULAR; INTRAVENOUS ONCE AS NEEDED
Status: DISCONTINUED | OUTPATIENT
Start: 2024-10-16 | End: 2024-10-16 | Stop reason: HOSPADM

## 2024-10-16 RX ORDER — ROCURONIUM BROMIDE 50 MG/5 ML
SYRINGE (ML) INTRAVENOUS AS NEEDED
Status: DISCONTINUED | OUTPATIENT
Start: 2024-10-16 | End: 2024-10-16 | Stop reason: SURG

## 2024-10-16 RX ORDER — OXYBUTYNIN CHLORIDE 10 MG/1
10 TABLET, EXTENDED RELEASE ORAL DAILY PRN
Qty: 10 TABLET | Refills: 0 | Status: SHIPPED | OUTPATIENT
Start: 2024-10-16

## 2024-10-16 RX ORDER — MAGNESIUM HYDROXIDE 1200 MG/15ML
LIQUID ORAL AS NEEDED
Status: DISCONTINUED | OUTPATIENT
Start: 2024-10-16 | End: 2024-10-16 | Stop reason: HOSPADM

## 2024-10-16 RX ORDER — ONDANSETRON 2 MG/ML
4 INJECTION INTRAMUSCULAR; INTRAVENOUS ONCE AS NEEDED
Status: COMPLETED | OUTPATIENT
Start: 2024-10-16 | End: 2024-10-16

## 2024-10-16 RX ORDER — DEXAMETHASONE SODIUM PHOSPHATE 4 MG/ML
INJECTION, SOLUTION INTRA-ARTICULAR; INTRALESIONAL; INTRAMUSCULAR; INTRAVENOUS; SOFT TISSUE AS NEEDED
Status: DISCONTINUED | OUTPATIENT
Start: 2024-10-16 | End: 2024-10-16 | Stop reason: SURG

## 2024-10-16 RX ORDER — IPRATROPIUM BROMIDE AND ALBUTEROL SULFATE 2.5; .5 MG/3ML; MG/3ML
3 SOLUTION RESPIRATORY (INHALATION) ONCE AS NEEDED
Status: COMPLETED | OUTPATIENT
Start: 2024-10-16 | End: 2024-10-16

## 2024-10-16 RX ORDER — FENTANYL CITRATE 50 UG/ML
INJECTION, SOLUTION INTRAMUSCULAR; INTRAVENOUS AS NEEDED
Status: DISCONTINUED | OUTPATIENT
Start: 2024-10-16 | End: 2024-10-16 | Stop reason: SURG

## 2024-10-16 RX ORDER — LABETALOL HYDROCHLORIDE 5 MG/ML
5 INJECTION, SOLUTION INTRAVENOUS
Status: DISCONTINUED | OUTPATIENT
Start: 2024-10-16 | End: 2024-10-16 | Stop reason: HOSPADM

## 2024-10-16 RX ORDER — KETAMINE HCL IN NACL, ISO-OSM 100MG/10ML
SYRINGE (ML) INJECTION AS NEEDED
Status: DISCONTINUED | OUTPATIENT
Start: 2024-10-16 | End: 2024-10-16 | Stop reason: SURG

## 2024-10-16 RX ORDER — TAMSULOSIN HYDROCHLORIDE 0.4 MG/1
1 CAPSULE ORAL NIGHTLY
Qty: 10 CAPSULE | Refills: 0 | Status: SHIPPED | OUTPATIENT
Start: 2024-10-16

## 2024-10-16 RX ORDER — OXYCODONE HYDROCHLORIDE 5 MG/1
5 TABLET ORAL EVERY 6 HOURS PRN
Qty: 5 TABLET | Refills: 0 | Status: SHIPPED | OUTPATIENT
Start: 2024-10-16

## 2024-10-16 RX ORDER — PROPOFOL 10 MG/ML
VIAL (ML) INTRAVENOUS AS NEEDED
Status: DISCONTINUED | OUTPATIENT
Start: 2024-10-16 | End: 2024-10-16 | Stop reason: SURG

## 2024-10-16 RX ORDER — IOPAMIDOL 612 MG/ML
INJECTION, SOLUTION INTRAVASCULAR AS NEEDED
Status: DISCONTINUED | OUTPATIENT
Start: 2024-10-16 | End: 2024-10-16 | Stop reason: HOSPADM

## 2024-10-16 RX ORDER — PHENAZOPYRIDINE HYDROCHLORIDE 100 MG/1
100 TABLET, FILM COATED ORAL DAILY PRN
Qty: 3 TABLET | Refills: 0 | Status: SHIPPED | OUTPATIENT
Start: 2024-10-16 | End: 2024-10-19

## 2024-10-16 RX ORDER — DOCUSATE SODIUM 100 MG/1
100 CAPSULE, LIQUID FILLED ORAL 2 TIMES DAILY
Qty: 15 CAPSULE | Refills: 1 | Status: SHIPPED | OUTPATIENT
Start: 2024-10-16 | End: 2024-10-23

## 2024-10-16 RX ORDER — SODIUM CHLORIDE, SODIUM LACTATE, POTASSIUM CHLORIDE, CALCIUM CHLORIDE 600; 310; 30; 20 MG/100ML; MG/100ML; MG/100ML; MG/100ML
100 INJECTION, SOLUTION INTRAVENOUS CONTINUOUS
Status: DISCONTINUED | OUTPATIENT
Start: 2024-10-16 | End: 2024-10-16 | Stop reason: HOSPADM

## 2024-10-16 RX ORDER — HYDRALAZINE HYDROCHLORIDE 20 MG/ML
5 INJECTION INTRAMUSCULAR; INTRAVENOUS
Status: DISCONTINUED | OUTPATIENT
Start: 2024-10-16 | End: 2024-10-16 | Stop reason: HOSPADM

## 2024-10-16 RX ORDER — MEPERIDINE HYDROCHLORIDE 25 MG/ML
12.5 INJECTION INTRAMUSCULAR; INTRAVENOUS; SUBCUTANEOUS
Status: DISCONTINUED | OUTPATIENT
Start: 2024-10-16 | End: 2024-10-16 | Stop reason: HOSPADM

## 2024-10-16 RX ORDER — CIPROFLOXACIN 500 MG/1
500 TABLET, FILM COATED ORAL 2 TIMES DAILY
Qty: 6 TABLET | Refills: 0 | Status: SHIPPED | OUTPATIENT
Start: 2024-10-16 | End: 2024-10-23

## 2024-10-16 RX ORDER — MIDAZOLAM HYDROCHLORIDE 2 MG/2ML
INJECTION, SOLUTION INTRAMUSCULAR; INTRAVENOUS AS NEEDED
Status: DISCONTINUED | OUTPATIENT
Start: 2024-10-16 | End: 2024-10-16 | Stop reason: SURG

## 2024-10-16 RX ORDER — ALBUTEROL SULFATE 90 UG/1
INHALANT RESPIRATORY (INHALATION) AS NEEDED
Status: DISCONTINUED | OUTPATIENT
Start: 2024-10-16 | End: 2024-10-16 | Stop reason: SURG

## 2024-10-16 RX ORDER — ESMOLOL HYDROCHLORIDE 10 MG/ML
INJECTION INTRAVENOUS AS NEEDED
Status: DISCONTINUED | OUTPATIENT
Start: 2024-10-16 | End: 2024-10-16 | Stop reason: SURG

## 2024-10-16 RX ORDER — MORPHINE SULFATE 2 MG/ML
1 INJECTION, SOLUTION INTRAMUSCULAR; INTRAVENOUS
Status: DISCONTINUED | OUTPATIENT
Start: 2024-10-16 | End: 2024-10-16 | Stop reason: HOSPADM

## 2024-10-16 RX ADMIN — PROPOFOL 230 MG: 10 INJECTION, EMULSION INTRAVENOUS at 13:07

## 2024-10-16 RX ADMIN — ESMOLOL HYDROCHLORIDE 20 MG: 10 INJECTION, SOLUTION INTRAVENOUS at 13:21

## 2024-10-16 RX ADMIN — LIDOCAINE HYDROCHLORIDE 60 MG: 20 INJECTION, SOLUTION INTRAVENOUS at 13:07

## 2024-10-16 RX ADMIN — SUGAMMADEX 300 MG: 100 INJECTION, SOLUTION INTRAVENOUS at 13:38

## 2024-10-16 RX ADMIN — MORPHINE SULFATE 2 MG: 2 INJECTION, SOLUTION INTRAMUSCULAR; INTRAVENOUS at 12:21

## 2024-10-16 RX ADMIN — FENTANYL CITRATE 50 MCG: 50 INJECTION, SOLUTION INTRAMUSCULAR; INTRAVENOUS at 13:07

## 2024-10-16 RX ADMIN — Medication 10 ML: at 08:07

## 2024-10-16 RX ADMIN — ONDANSETRON 4 MG: 2 INJECTION INTRAMUSCULAR; INTRAVENOUS at 13:07

## 2024-10-16 RX ADMIN — ONDANSETRON 4 MG: 2 INJECTION INTRAMUSCULAR; INTRAVENOUS at 14:22

## 2024-10-16 RX ADMIN — ALBUTEROL SULFATE 10 PUFF: 90 AEROSOL, METERED RESPIRATORY (INHALATION) at 13:21

## 2024-10-16 RX ADMIN — DEXAMETHASONE SODIUM PHOSPHATE 8 MG: 4 INJECTION, SOLUTION INTRA-ARTICULAR; INTRALESIONAL; INTRAMUSCULAR; INTRAVENOUS; SOFT TISSUE at 13:07

## 2024-10-16 RX ADMIN — IPRATROPIUM BROMIDE AND ALBUTEROL SULFATE 3 ML: .5; 3 SOLUTION RESPIRATORY (INHALATION) at 14:30

## 2024-10-16 RX ADMIN — Medication 20 MG: at 13:29

## 2024-10-16 RX ADMIN — SODIUM CHLORIDE 2000 MG: 9 INJECTION, SOLUTION INTRAVENOUS at 13:04

## 2024-10-16 RX ADMIN — MORPHINE SULFATE 2 MG: 2 INJECTION, SOLUTION INTRAMUSCULAR; INTRAVENOUS at 08:27

## 2024-10-16 RX ADMIN — Medication 20 MG: at 13:23

## 2024-10-16 RX ADMIN — MIDAZOLAM HYDROCHLORIDE 1 MG: 1 INJECTION, SOLUTION INTRAMUSCULAR; INTRAVENOUS at 13:04

## 2024-10-16 RX ADMIN — Medication 50 MG: at 13:07

## 2024-10-16 RX ADMIN — SODIUM CHLORIDE, POTASSIUM CHLORIDE, SODIUM LACTATE AND CALCIUM CHLORIDE 100 ML/HR: 600; 310; 30; 20 INJECTION, SOLUTION INTRAVENOUS at 12:41

## 2024-10-16 NOTE — PLAN OF CARE
Goal Outcome Evaluation:  Plan of Care Reviewed With: patient                Pt went for procedure during shift. Pt ready for dc per Dr. Beckford.

## 2024-10-16 NOTE — OP NOTE
Preoperative diagnosis  right kidney stone    Postoperative diagnosis  right kidney stone    Procedure performed  1.  Flexible cystoscopy, right retrograde pyelogram with ureteral stent placement   2.  Right ureteroscopy with holmium-YAG laser lithotripsy (20465)   3.  Fluoroscopy time < 1 hour with interpretation of images    Surgeon  Todd Burton MD    Anesthesia  General    Complications  None     Specimen  None    EBL  Minimal    Findings  Urethroscopy revealed no strictures or other abnormalities.  Cystoscopy revealed no tumors, stones or other mucosal abnormalities.  Retrograde pyelogram revealed a delicate system with identification of the stone seen on pre-op imaging.  No other filling defects and caliber of the ureter was smooth and normal.  Ureteroscopy revealed large right proximal ureteral stone, which was pushed up to the kidney and lasered completely.     Indications  34 y.o. female agreed to undergo the above named procedure after discussion of the alternatives, risks and benefits.  Informed consent was obtained.      Procedure  The patient was taken to the operating room, identified by name and medical record number, and placed supine on the operating table.  Pre-operative antibiotics were administered.  Bilateral lower extremity SCDs were placed.  After induction of general anesthesia the patient was positioned in dorsal lithotomy, prepped and draped in a sterile fashion.  A time-out was performed.      A 14-[place] flexible cystoscope was passed carefully via urethra into the bladder.  The ureteral orifice was identified and a Sensor wire was passed retrograde to the level of the kidney and confirmed by fluoroscopy.  The flexible scope was off-loaded and the bladder emptied with a straight catheter.  A dual lumen open-ended catheter was passed over the wire. A retrograde pyelogram was performed by slowly injecting 5 mL of 50% Omnipaque contrast via the catheter with findings described above.  An  Amplatz super-stiff was placed to the level of the renal pelvis and confirmed by fluoroscopy. The dual lumen was removed. The Sensor wire was clipped to the drape as a safety wire.  A ureteral access sheath was advanced over the super-stiff wire to level of the UPJ under direct fluoroscopic guidance. The kidney was entered with the flexible ureteroscope.  The kidney stone was identified and fragmented with a 200-micron  laser fiber at laser settings of 0.4 joules and a frequency of 80 Hz. At the completion of the procedure, all clinically significant fragments were removed and only dust-like fragments remained.  The access sheath was removed under direct vision.  Ureteral edema but no obvious obstruction was present. A retrograde pyelogram was performed and a  6 Fr x 22 cm stent was positioned with the upper end in the kidney and the lower in the bladder confirmed by fluoroscopy. The bladder was emptied and the procedure was complete. The patient tolerated the procedure well and was stable throughout.    The patient will follow up with me next week for stent removal.  She can go home later today if she feels well.

## 2024-10-16 NOTE — DISCHARGE SUMMARY
[facility]      Name:  Carolyn Theodore   Age:  34 y.o.  Sex:  female  :  1990  MRN:  [id number]   Visit Number:  [id number]    Admission Date:  10/15/2024  Date of Discharge:  10/16/2024  Primary Care Physician:  Clement Velasco DO        Discharge Diagnoses:     Right kidney stone   Hypertension  Depression/anxiety  Morbid obesity       Problem List:     Active Hospital Problems    Diagnosis  POA    **Kidney stone [N20.0]  Yes    Hydronephrosis with urinary obstruction due to renal calculus [N13.2]  Unknown      Resolved Hospital Problems   No resolved problems to display.     Presenting Problem:    Chief Complaint   Patient presents with    Flank Pain      Consults:     Consulting Physician(s)         Provider   Role Specialty     Todd Burton MD      Consulting Physician Urology          Procedures Performed:    Procedure(s):  URETEROSCOPY LASER LITHOTRIPSY WITH STENT INSERTION retrograde    History of presenting illness/Hospital Course:    Patient is a morbidly obese 34-year-old female with history significant for hypertension, depression and anxiety who presents to the emergency room with worsening right flank pain.  Patient already had known kidney stone for which she was seen by urology outpatient.  Planning to schedule intervention.  However, pain became so severe prompting patient to present to the emergency room.  She admits to nausea without vomiting.  No fever/chills.     ED summary: Patient afebrile, hypertensive, nonhypoxic on room air.  CMP unremarkable.  CBC with mild leukocytosis of 11.71.  UA negative for nitrites.  Trace leukocytes and bacteria.  CT abdomen pelvis revealed mild right hydronephrosis secondary to a 7 mm right UPJ stone.  Patient received morphine and Zofran.  Dr. Burton agreed to consult hospitalist asked to admit.  Patient underwent flexible cystoscopy, right retrograde pyelogram with ureteral stent placement and  right ureteroscopy with holmium YAG laser lithotripsy.  Patient tolerated procedure well.  Discharged home afterward with close outpatient follow-up.    Vital Signs:    Temp:  [97.6 °F (36.4 °C)-98.8 °F (37.1 °C)] 97.6 °F (36.4 °C)  Heart Rate:  [68-96] 78  Resp:  [14-20] 18  BP: (138-185)/() 158/94    Physical Exam:    General Appearance:  Alert and cooperative.    Head:  Atraumatic and normocephalic.   Eyes: Conjunctivae and sclerae normal, no icterus. No pallor.   Ears:  Ears with no abnormalities noted.   Throat: No oral lesions, no thrush, oral mucosa moist.   Neck: Supple, trachea midline, no thyromegaly.   Back:   No kyphoscoliosis present. No tenderness to palpation.   Lungs:   Breath sounds heard bilaterally equally.  No crackles or wheezing. No Pleural rub or bronchial breathing.   Heart:  Normal S1 and S2, no murmur, no gallop, no rub. No JVD.   Abdomen:   Normal bowel sounds, no masses, no organomegaly. Soft, nontender, nondistended, no rebound tenderness.   Extremities: Supple, no edema, no cyanosis, no clubbing.   Pulses: Pulses palpable bilaterally.   Skin: No bleeding or rash.   Neurologic: Alert and oriented x 3. No facial asymmetry. Moves all four limbs. No tremors.     Pertinent Lab Results:     Results from last 7 days   Lab Units 10/16/24  0621 10/15/24  1049 10/12/24  1718   SODIUM mmol/L 141 141 135*   POTASSIUM mmol/L 3.9 3.9 3.9   CHLORIDE mmol/L 105 104 101   CO2 mmol/L 24.6 25.9 24.2   BUN mg/dL 14 11 9   CREATININE mg/dL 1.06* 0.99 1.21*   CALCIUM mg/dL 8.5* 9.3 9.1   BILIRUBIN mg/dL  --  0.5 0.7   ALK PHOS U/L  --  96 92   ALT (SGPT) U/L  --  16 16   AST (SGOT) U/L  --  18 16   GLUCOSE mg/dL 107* 97 103*     Results from last 7 days   Lab Units 10/16/24  0621 10/15/24  1010 10/12/24  1659   WBC 10*3/mm3 10.77 11.71* 16.02*   HEMOGLOBIN g/dL 10.4* 11.8* 12.0   HEMATOCRIT % 34.5 38.6 38.6   PLATELETS 10*3/mm3 287 352 348                     Results from last 7 days   Lab Units  10/15/24  1049   LIPASE U/L 21               Pertinent Radiology Results:    Imaging Results (All)       Procedure Component Value Units Date/Time    CT Abdomen Pelvis Without Contrast [[id number]] Collected: 10/15/24 1128     Updated: 10/15/24 1204    Narrative:      PROCEDURE: CT ABDOMEN PELVIS WO CONTRAST-     HISTORY: Right renal stone     COMPARISON: 2/2015.     PROCEDURE: Axial images were obtained from the lung bases through the  pubic symphysis without intravenous contrast.     FINDINGS:     ABDOMEN: The lung bases are clear. The heart size is normal. There are  postsurgical changes of prior gastric sleeve. The limited noncontrast  images of the liver are normal. The gallbladder is surgically absent.  The spleen is normal. No adrenal masses are seen.  The pancreas has an  unremarkable unenhanced appearance. The aorta is normal in caliber. A  new supraumbilical hernia is seen containing fat measuring up to 4 cm.  There is a stable small umbilical hernia containing fat. There is no  significant free fluid or adenopathy. There is mild right hydronephrosis  secondary to a 7 mm right UPJ stone. There is minimal left  nephrolithiasis. Limited noncontrast images of the bowel are  unremarkable.     PELVIS: The appendix is not identified. Uterus and ovaries are  unremarkable. The urinary bladder is unremarkable. There is no  significant pelvic free fluid, mass, or adenopathy.       Impression:      Mild right hydronephrosis secondary to a 7 mm right UPJ  stone.              CTDI: 23.56 mGy  DLP: 1195.95 mGy.cm        This study was performed with techniques to keep radiation doses as low  as reasonably achievable (ALARA). Individualized dose reduction  techniques using automated exposure control or adjustment of mA and/or  kV according to the patient size were employed.              Images were reviewed, interpreted, and dictated by Dr. Ralph Thomas MD  Transcribed by Courtney Metcalf PA-C.     This report was signed  and finalized on 10/15/2024 12:02 PM by Ralph Thomas MD.               Echo:      Condition on Discharge:      Stable.    Code status during the hospital stay:    Code Status and Medical Interventions: CPR (Attempt to Resuscitate); Full Support   Ordered at: 10/15/24 1256     Code Status (Patient has no pulse and is not breathing):    CPR (Attempt to Resuscitate)     Medical Interventions (Patient has pulse or is breathing):    Full Support     Discharge Disposition:    Home or Self Care    Discharge Medications:       Discharge Medications        New Medications        Instructions Start Date   acetaminophen 500 MG tablet  Commonly known as: TYLENOL   1,000 mg, Oral, Every 6 Hours      ciprofloxacin 500 MG tablet  Commonly known as: Cipro   500 mg, Oral, 2 Times Daily      docusate sodium 100 MG capsule  Commonly known as: Colace   100 mg, Oral, 2 Times Daily, If taking oxycodone      naloxone 4 MG/0.1ML nasal spray  Commonly known as: NARCAN   Call 911. Don't prime. [place] in 1 nostril for overdose. Repeat in 2-3 minutes in other nostril if no or minimal breathing/responsiveness.      oxybutynin XL 10 MG 24 hr tablet  Commonly known as: Ditropan XL   10 mg, Oral, Daily PRN      phenazopyridine 100 MG tablet  Commonly known as: PYRIDIUM   Take 1 tablet by mouth Daily As Needed for urinary burning for up to 3 days.      tamsulosin 0.4 MG capsule 24 hr capsule  Commonly known as: FLOMAX   0.4 mg, Oral, Nightly             Changes to Medications        Instructions Start Date   loratadine 10 MG tablet  Commonly known as: CLARITIN  What changed: when to take this   10 mg, Oral, Daily      oxyCODONE 5 MG immediate release tablet  Commonly known as: Roxicodone  What changed: Another medication with the same name was added. Make sure you understand how and when to take each.   5 mg, Oral, Every 6 Hours PRN      oxyCODONE 5 MG immediate release tablet  Commonly known as: Roxicodone  What changed: You were already taking a  medication with the same name, and this prescription was added. Make sure you understand how and when to take each.   5 mg, Oral, Every 6 Hours PRN             Continue These Medications        Instructions Start Date   albuterol (2.5 MG/3ML) 0.083% nebulizer solution  Commonly known as: PROVENTIL   2.5 mg, Nebulization, Every 4 Hours PRN      Albuterol-Budesonide 90-80 MCG/ACT aerosol   2 Inhalations, Inhalation, Every 2 Hours PRN      Biotin 5 MG capsule   5 mg, Nightly      Budeson-Glycopyrrol-Formoterol 160-9-4.8 MCG/ACT aerosol inhaler  Commonly known as: BREZTRI   2 puffs, Inhalation, 2 Times Daily      esomeprazole 20 MG capsule  Commonly known as: nexIUM   20 mg, Nightly      vitamin D3 125 MCG (5000 UT) capsule capsule   5,000 Units, Daily             ASK your doctor about these medications        Instructions Start Date   lisdexamfetamine 20 MG capsule  Commonly known as: Vyvanse   20 mg, Oral, Every Morning      multivitamin with minerals tablet tablet   1 tablet, Daily      predniSONE 20 MG tablet  Commonly known as: DELTASONE   3 po daily for 3 days, 2 po daily for 3 days, 1 po daily for 3 days      Vitamin C 500 MG capsule   Take  by mouth.             Discharge Diet:     Diet Instructions       Diet: Regular/House Diet; Regular (IDDSI 7); Thin (IDDSI 0)      Discharge Diet: Regular/House Diet    Texture: Regular (IDDSI 7)    Fluid Consistency: Thin (IDDSI 0)          Activity at Discharge:     Activity Instructions       Activity as Tolerated            Follow-up Appointments:    Additional Instructions for the Follow-ups that You Need to Schedule       Discharge Follow-up with PCP   As directed       Currently Documented PCP:    Clement Velasco DO    PCP Phone Number:    [phone]     Follow Up Details: 1 week        Discharge Follow-up with Specified Provider: Dr Burton; 1 Week   As directed      To: Dr Burton   Follow Up: 1 Week               Follow-up Information       Clement Velasco DO .     Specialty: Family Medicine  Why: 1 week  Contact information:  [address] [place] [id number] [phone]                           No future appointments.  Test Results Pending at Discharge:           Mich Beckford DO  10/16/24  15:20 EDT    Time: I spent >30 minutes on this discharge activity which included: face-to-face encounter with the patient, reviewing the data in the system, coordination of the care with the nursing staff as well as consultants, documentation, and entering orders.     Dictated utilizing Dragon dictation.

## 2024-10-16 NOTE — ANESTHESIA PROCEDURE NOTES
Airway  Urgency: elective    Date/Time: 10/16/2024 1:09 PM  End Time:10/16/2024 1:10 PM  Airway not difficult    General Information and Staff    Patient location during procedure: OR  CRNA/CAA: Caro Schwartz CRNA    Indications and Patient Condition  Indications for airway management: airway protection    Preoxygenated: yes  MILS maintained throughout  Mask difficulty assessment: 1 - vent by mask    Final Airway Details  Final airway type: endotracheal airway      Successful airway: ETT  Cuffed: yes   Successful intubation technique: direct laryngoscopy  Endotracheal tube insertion site: oral  Blade: Fraga  Blade size: 3  ETT size (mm): 7.0  Cormack-Lehane Classification: grade I - full view of glottis  Placement verified by: chest auscultation and capnometry   Cuff volume (mL): 9  Measured from: teeth  ETT/EBT  to teeth (cm): 20  Number of attempts at approach: 1  Assessment: lips, teeth, and gum same as pre-op and atraumatic intubation    Additional Comments  AOI x 1 PASS, +ETCO2, +R/F/C. MOUTH TEETH GUMS SAME AS PREOP

## 2024-10-16 NOTE — CASE MANAGEMENT/SOCIAL WORK
Case Management Discharge Note      Final Note: Patient plans to return home with parents    Provided Post Acute Provider List?: N/A  N/A Provider List Comment: Patient plans to return home; no new needs  Provided Post Acute Provider Quality & Resource List?: N/A  N/A Quality & Resource List Comment: Patient plans to return home; no new needs    Selected Continued Care - Admitted Since 10/15/2024       Destination    No services have been selected for the patient.                Durable Medical Equipment    No services have been selected for the patient.                Dialysis/Infusion    No services have been selected for the patient.                Home Medical Care    No services have been selected for the patient.                Therapy    No services have been selected for the patient.                Community Resources    No services have been selected for the patient.                Community & DME    No services have been selected for the patient.                    Transportation Services  Private: Car    Final Discharge Disposition Code: 01 - home or self-care

## 2024-10-16 NOTE — DISCHARGE INSTRUCTIONS
Home Care After Ureteroscopy and Laser Lithotripsy  The following instructions will help you care for yourself, or be cared for upon your return home today.    These are guidelines for your care right after surgery only.     Diet  Drink plenty of liquids and eat light meals today.    Start your regular diet tomorrow.    Activity  Start normal activities in twenty-four (24) hours.    Wound Care and Hygiene  No restrictions, start normal routine.    Anesthesia Precautions & Expectations  After anesthesia, rest for 24 hours.    Do not drive, drink alcoholic beverages or make any important decisions during this time.  General anesthesia may cause a sore throat, jaw discomfort or muscle aches.    These symptoms can last for one or two days.     What to Expect after Surgery  Mild pain with voiding.  Frequency or urgency.  Bladder cramps.  Minimal bleeding with voiding.    Call your Doctor  Passing clots in urine preventing bladder emptying  Severe pain not controlled by oral medication  Temperature above 101.5 degrees  Inability to urinate within eight (8) hours after surgery    After Stent Placement  It is common to have blood tinged urine for 3-5 days.  It is common to have pain in your side and in your back when you urinate for 3-5 days.  It is common to have urgency with urination.  This is a temporary stent and will need to be removed in the office in 1 week.  Do not take the Pyridium 24 hours prior to your stent removal.    Other Contacts  Urology Office:  [address]   [place] [address] [phone] office  [phone] fax    Other Instructions  Take tamsulosin daily until the stent comes out.  Take oxybutynin daily as needed for bladder spasm while the stent is in.  Take Pyridium up to 3 times daily as needed for burning with urination.   Take Tylenol scheduled every 6 hours for the first 3 days.  Take the oxycodone on top of that as needed.  Take all of the antibiotics.     Follow up  Appointment  1 week for stent removal. Please See After visit Summary. Call if you do not have an appt already scheduled.

## 2024-10-16 NOTE — ANESTHESIA PREPROCEDURE EVALUATION
Anesthesia Evaluation     Patient summary reviewed   no history of anesthetic complications:   NPO Solid Status: > 8 hours  NPO Liquid Status: > 8 hours           Airway   Mallampati: II  TM distance: >3 FB  Neck ROM: full  Difficult intubation highly probable, Possible difficult intubation and Large neck circumference  Dental      Pulmonary    (+) pneumonia resolved , a smoker (vapes) Current, asthma,shortness of breath, sleep apnea, decreased breath sounds  Cardiovascular     (+) hypertension, GAY      Neuro/Psych  (+) headaches, psychiatric history  GI/Hepatic/Renal/Endo    (+) obesity, morbid obesity, GERD, liver disease fatty liver disease history of elevated LFT, renal disease- stonesDiabetes: borderline.    Musculoskeletal     (+) arthralgias, back pain, chronic pain, myalgias  Abdominal   (+) obese   Substance History      OB/GYN          Other   blood dyscrasia anemia,chronic steroid use      ROS/Med Hx Other: Hx of gastric sleeve   Labs reviewed  10/34                 Anesthesia Plan    ASA 3     general     (Risks and benefits discussed including risk of aspiration, recall and dental damage. All patient questions answered.    Will continue with plan of care.)  intravenous induction     Anesthetic plan, risks, benefits, and alternatives have been provided, discussed and informed consent has been obtained with: patient.  Pre-procedure education provided    CODE STATUS:    Code Status (Patient has no pulse and is not breathing): CPR (Attempt to Resuscitate)  Medical Interventions (Patient has pulse or is breathing): Full Support

## 2024-10-16 NOTE — PLAN OF CARE
Goal Outcome Evaluation:              Outcome Evaluation: VSS. No acute events during this shift. No concerns voiced at this time.

## 2024-10-17 ENCOUNTER — TRANSITIONAL CARE MANAGEMENT TELEPHONE ENCOUNTER (OUTPATIENT)
Dept: CALL CENTER | Facility: HOSPITAL | Age: 34
End: 2024-10-17
Payer: MEDICAID

## 2024-10-17 NOTE — OUTREACH NOTE
Prep Survey      Flowsheet Row Responses   [facility] patient discharged from? [place]   Is LACE score < 7 ? Yes   Eligibility [facility]   Date of Admission 10/15/24   Date of Discharge 10/16/24   Discharge diagnosis URETEROSCOPY LASER LITHOTRIPSY WITH STENT INSERTION retrograde   Does the patient have one of the following disease processes/diagnoses(primary or secondary)? Other   Prep survey completed? Yes            Trisha KAUFMAN - Registered Nurse

## 2024-10-17 NOTE — OUTREACH NOTE
Call Center TCM Note      Flowsheet Row Responses   [facility] patient discharged from? [place]   Does the patient have one of the following disease processes/diagnoses(primary or secondary)? Other   TCM attempt successful? Yes   Call start time 0817   Call end time 0819   Discharge diagnosis URETEROSCOPY LASER LITHOTRIPSY WITH STENT INSERTION retrograde   Meds reviewed with patient/caregiver? Yes   Is the patient having any side effects they believe may be caused by any medication additions or changes? No   Does the patient have all medications ordered at discharge? Yes   Is the patient taking all medications as directed (includes completed medication regime)? Yes   Medication comments Educated patient on the importance of finishing her antibiotic prescription, patient verbalized understanding.   Comments Hospital follow up appt with Dr. Mancini at PCP office on 10/23   Does the patient have an appointment with their PCP within 7-14 days of discharge? Yes   Has home health visited the patient within 72 hours of discharge? N/A   Psychosocial issues? No   Did the patient receive a copy of their discharge instructions? Yes   Nursing interventions Reviewed instructions with patient   What is the patient's perception of their health status since discharge? Improving   Is the patient/caregiver able to teach back signs and symptoms related to disease process for when to call PCP? Yes   Is the patient/caregiver able to teach back signs and symptoms related to disease process for when to call 911? Yes   Is the patient/caregiver able to teach back the hierarchy of who to call/visit for symptoms/problems? PCP, Specialist, Home health nurse, Urgent Care, ED, 911 Yes   Additional teach back comments Reviewed signs/symptoms of infection   TCM call completed? Yes   Wrap up additional comments States she is doing well, no questions, confirmed hospital follow up appt with PCP office for 10/23.   Call end time 0819   Would  this patient benefit from a Referral to [facility] Social Work? No   Is the patient interested in additional calls from an ambulatory ? No            Mary Jo Bradley RN    10/17/2024, 08:20 EDT

## 2024-10-17 NOTE — ANESTHESIA POSTPROCEDURE EVALUATION
Patient: Carolyn Theodore    Procedure Summary       Date: 10/16/24 Room / Location: [facility] /  [facility]    Anesthesia Start: 1304 Anesthesia Stop: 1349    Procedure: URETEROSCOPY LASER LITHOTRIPSY WITH STENT INSERTION retrograde (Right) Diagnosis:       Hydronephrosis with urinary obstruction due to renal calculus      (Hydronephrosis with urinary obstruction due to renal calculus [N13.2])    Surgeons: Todd Burton MD Provider: Caro Schwartz CRNA    Anesthesia Type: general ASA Status: 3            Anesthesia Type: general    Vitals  Vitals Value Taken Time   /98 10/16/24 1450   Temp 98.8 °F (37.1 °C) 10/16/24 1450   Pulse 87 10/16/24 1458   Resp 18 10/16/24 1450   SpO2 90 % 10/16/24 1458   Vitals shown include unfiled device data.        Post Anesthesia Care and Evaluation    Patient location during evaluation: PACU  Patient participation: complete - patient participated  Level of consciousness: awake and alert  Pain score: 1 (back pain)  Pain management: satisfactory to patient    Airway patency: patent  Anesthetic complications: No anesthetic complications  PONV Status: none  Cardiovascular status: acceptable and stable  Respiratory status: acceptable  Hydration status: acceptable    Comments: Vitals signs as noted in nursing documentation as per protocol.  Pt continued with wheezes in left lung.  Breathing treatment given in recovery.  Pt stated no difficulty breathing.  Sa02 92-95% room air.  Barky cough that resolved after breathing treatment.

## 2024-10-18 NOTE — PROGRESS NOTES
October 18, 2024    Hello, may I speak with Carolyn Theodore?    My name is  Ying.     I am  with [facility]  [address]  [address]  [facility] [address] [phone].    Before we get started may I verify your date of birth? 1990    I am calling to officially welcome you to our practice and ask about your recent visit. Is this a good time to talk?  Unable to reach patient.    Tell me about your visit with us. What things went well?         We're always looking for ways to make our patients' experiences even better. Do you have recommendations on ways we may improve?      Overall were you satisfied with your first visit to our practice?        I appreciate you taking the time to speak with me today. Is there anything else I can do for you?       Thank you, and have a great day.

## 2024-10-23 ENCOUNTER — OFFICE VISIT (OUTPATIENT)
Dept: FAMILY MEDICINE CLINIC | Facility: CLINIC | Age: 34
End: 2024-10-23
Payer: MEDICAID

## 2024-10-23 VITALS
HEART RATE: 91 BPM | WEIGHT: 293 LBS | DIASTOLIC BLOOD PRESSURE: 90 MMHG | BODY MASS INDEX: 57.43 KG/M2 | OXYGEN SATURATION: 99 % | TEMPERATURE: 97.6 F | SYSTOLIC BLOOD PRESSURE: 140 MMHG

## 2024-10-23 DIAGNOSIS — K21.9 GASTROESOPHAGEAL REFLUX DISEASE WITHOUT ESOPHAGITIS: ICD-10-CM

## 2024-10-23 DIAGNOSIS — Z98.890 STATUS POST LASER LITHOTRIPSY OF URETERAL CALCULUS: ICD-10-CM

## 2024-10-23 DIAGNOSIS — Z96.0 RETAINED URETHRAL STENT: Primary | ICD-10-CM

## 2024-10-23 DIAGNOSIS — F41.9 ANXIETY: ICD-10-CM

## 2024-10-23 DIAGNOSIS — N20.0 RENAL CALCULI: ICD-10-CM

## 2024-10-23 DIAGNOSIS — J30.2 ACUTE SEASONAL ALLERGIC RHINITIS: ICD-10-CM

## 2024-10-23 RX ORDER — LORATADINE 10 MG/1
10 TABLET ORAL DAILY
Qty: 30 TABLET | Refills: 2 | Status: SHIPPED | OUTPATIENT
Start: 2024-10-23

## 2024-10-23 RX ORDER — DESVENLAFAXINE 25 MG/1
25 TABLET, EXTENDED RELEASE ORAL DAILY
Qty: 30 TABLET | Refills: 1 | Status: SHIPPED | OUTPATIENT
Start: 2024-10-23

## 2024-10-23 NOTE — PROGRESS NOTES
Transitional Care Follow Up Visit  Subjective     Carolyngeraldo Theodore is a 34 y.o. female who presents for a transitional care management visit.    Within 48 business hours after discharge our office contacted her via telephone to coordinate her care and needs.      I reviewed and discussed the details of that call along with the discharge summary, hospital problems, inpatient lab results, inpatient diagnostic studies, and consultation reports with Carolyn.     Current outpatient and discharge medications have been reconciled for the patient.  Reviewed by: JEAN Roth          10/16/2024     8:07 PM   Date of TCM Phone Call   [facility]   Date of Admission 10/15/2024   Date of Discharge 10/16/2024     Risk for Readmission (LACE) Score: 4 (10/16/2024  6:00 AM)      History of Present Illness  Improvement in discomfort. Urethral stent in place.       History of presenting illness/Hospital Course:     Patient is a morbidly obese 34-year-old female with history significant for hypertension, depression and anxiety who presents to the emergency room with worsening right flank pain.  Patient already had known kidney stone for which she was seen by urology outpatient.  Planning to schedule intervention.  However, pain became so severe prompting patient to present to the emergency room.  She admits to nausea without vomiting.  No fever/chills.     ED summary: Patient afebrile, hypertensive, nonhypoxic on room air.  CMP unremarkable.  CBC with mild leukocytosis of 11.71.  UA negative for nitrites.  Trace leukocytes and bacteria.  CT abdomen pelvis revealed mild right hydronephrosis secondary to a 7 mm right UPJ stone.  Patient received morphine and Zofran.  Dr. Burton agreed to consult hospitalist asked to admit.  Patient underwent flexible cystoscopy, right retrograde pyelogram with ureteral stent placement and right ureteroscopy with holmium YAG laser lithotripsy.  Patient tolerated procedure  well.  Discharged home afterward with close outpatient follow-up.     The following portions of the patient's history were reviewed and updated as appropriate: allergies, current medications, past family history, past medical history, past social history, past surgical history, and problem list.    Review of Systems    Objective   /90 (BP Location: Left arm, Patient Position: Sitting, Cuff Size: Adult)   Pulse 91   Temp 97.6 °F (36.4 °C)   Wt (!) 142 kg (314 lb)   SpO2 99%   BMI 57.43 kg/m²   Physical Exam  Vitals and nursing note reviewed.   HENT:      Nose: Nose normal.      Mouth/Throat:      Lips: Pink.   Eyes:      General: Lids are normal.      Conjunctiva/sclera: Conjunctivae normal.      Pupils: Pupils are equal, round, and reactive to light.   Cardiovascular:      Rate and Rhythm: Normal rate and regular rhythm.      Heart sounds: Normal heart sounds.   Pulmonary:      Effort: Pulmonary effort is normal.      Breath sounds: Normal breath sounds.   Neurological:      Mental Status: She is alert and oriented to person, place, and time.      Gait: Gait is intact.   Psychiatric:         Attention and Perception: Attention normal.         Mood and Affect: Mood and affect normal.         Speech: Speech normal.         Behavior: Behavior normal. Behavior is cooperative.         Assessment & Plan   Diagnoses and all orders for this visit:    1. Retained urethral stent (Primary)    2. Renal calculi    3. Status post laser lithotripsy of ureteral calculus    4. Anxiety  -     Desvenlafaxine Succinate ER (Pristiq) 25 MG tablet sustained-release 24 hour; Take 1 tablet by mouth Daily.  Dispense: 30 tablet; Refill: 1    5. Acute seasonal allergic rhinitis  -     loratadine (CLARITIN) 10 MG tablet; Take 1 tablet by mouth Daily.  Dispense: 30 tablet; Refill: 2    6. Gastroesophageal reflux disease without esophagitis  -     esomeprazole (nexIUM) 20 MG capsule; Take 1 capsule by mouth Every Night.  Dispense: 30  capsule; Refill: 2    Patient to follow up with urology as scheduled.    Risks, benefits, and potential side effects of current/new medications reviewed with patient.  Patient voiced understanding and wished to proceed with treatment.    Anti - reflux measures, trigger foods and drinks to avoid: Fatty foods, alcohol, chocolate, coffee, tea, caffeinated soft drinks (decaffeinated coffee still has some caffeine), peppermint and spearmint, spices and vinegar, citrus fruits and juices, tomatoes and tomato sauces, and smoking. Other antireflux measures include weight reduction if overweight, avoid tight clothing around the abdomen, elevate the head of her bed 6 inches (May use a bed wedge which is placed between the mattress in box Maryneal) or blocks under the head of the bed, don't drink or eat for 2 hours before going to bed and avoid lying down immediately after meals.    Patient was encouraged to keep me informed of any acute changes, lack of improvement, or any new concerning symptoms.    Maira Abernathy, JEAN  10/23/2024

## 2024-10-24 ENCOUNTER — PROCEDURE VISIT (OUTPATIENT)
Dept: UROLOGY | Facility: CLINIC | Age: 34
End: 2024-10-24
Payer: MEDICAID

## 2024-10-24 DIAGNOSIS — N20.0 KIDNEY STONE: Primary | ICD-10-CM

## 2024-10-24 NOTE — PROGRESS NOTES
Preoperative diagnosis  Foreign body in genitourinary tract    Postoperative diagnosis  Foreign body in genitourinary tract    Procedure  Flexible cystourethroscopy with stent removal    Attending surgeon  Todd Burton MD    Anesthesia  2% lidocaine jelly intraurethrally    Complications  None    Indications  34 y.o. female who is status post ureteroscopy with laser lithotripsy who presents for stent removal.    Procedure  Detailed information of all possible complications and side effects were discussed with the patient.  Informed consent was obtained. Patient was given one dose of antibiotics. The patient was placed in supine position and a timeout was performed. The patient was prepped and draped in sterile fashion.  Next, 2% lidocaine jelly was bluntly injected per urethra without difficulty. The 14 [place] flexible cystoscope was passed through the urethra and into the bladder.  The stent was visualized, grasped and removed in its entirety.  The patient tolerated the procedure well.    Plan  1. Provided education regarding water intake of at least 2 liters per day  2. F/u in 8 weeks with a renal ultrasound with PAU

## 2024-10-24 NOTE — ED PROVIDER NOTES
Subjective:  History of Present Illness:    Patient is a 34-year-old female with complaint of right sided flank pain x 4 days.  Patient reports that she was previously seen for renal stone.  In urology clinic yesterday and plans were being made to schedule surgical intervention for stone.  She denies fever.  Reports dysuria.  Denies changes in bowel habit.  Denies OTC medication or home remedy.  Denies alleviating or exacerbating factors.    Nurses Notes reviewed and agree, including vitals, allergies, social history and prior medical history.     REVIEW OF SYSTEMS: All systems reviewed and not pertinent unless noted.  Review of Systems   Genitourinary:  Positive for dysuria and flank pain.   All other systems reviewed and are negative.      Past Medical History:   Diagnosis Date    Abnormal liver enzymes     Asthma     Positive methacholine challenge test.  Frequent bronchitis.    Depression     Eosinophilia     Resolved in the past once off of Singulair.    Extreme obesity with alveolar hypoventilation 2016    GERD (gastroesophageal reflux disease)     w/o esophagitis    Hypertension     Morbid obesity     Obesity      Negative sleep study in the past, no nocturnal hypoxemia on the study    Peripheral edema 2016    PONV (postoperative nausea and vomiting)     Renal calculi     Sleep apnea        Allergies:    Doxycycline, Nsaids, Amoxicillin, and Penicillins      Past Surgical History:   Procedure Laterality Date     SECTION      CHOLECYSTECTOMY      GASTRIC SLEEVE LAPAROSCOPIC      KIDNEY STONE SURGERY      TONSILLECTOMY      URETEROSCOPY LASER LITHOTRIPSY WITH STENT INSERTION Right 10/16/2024    Procedure: URETEROSCOPY LASER LITHOTRIPSY WITH STENT INSERTION retrograde;  Surgeon: Todd Burton MD;  Location: [facility];  Service: Urology;  Laterality: Right;         Social History     Socioeconomic History    Marital status:    Tobacco Use    Smoking status: Never     Passive  "exposure: Never    Smokeless tobacco: Never   Vaping Use    Vaping status: Every Day    Substances: Nicotine, Flavoring   Substance and Sexual Activity    Alcohol use: No    Drug use: Never    Sexual activity: Yes     Partners: Male     Birth control/protection: None         Family History   Problem Relation Age of Onset    Hypertension Father     Diabetes Father     Alcohol abuse Other     Cancer Other     Hypertension Other     Hypertension Other        Objective  Physical Exam:  /94 (BP Location: Left arm, Patient Position: Lying)   Pulse 78   Temp 97.6 °F (36.4 °C) (Oral)   Resp 18   Ht 157.5 cm (62\")   Wt (!) 144 kg (318 lb)   LMP 09/28/2024 (Approximate)   SpO2 93%   BMI 58.16 kg/m²      Physical Exam  Vitals and nursing note reviewed.   Constitutional:       Appearance: Normal appearance. She is normal weight.   HENT:      Head: Normocephalic and atraumatic.      Nose: Nose normal.      Mouth/Throat:      Mouth: Mucous membranes are moist.      Pharynx: Oropharynx is clear.   Eyes:      Extraocular Movements: Extraocular movements intact.      Conjunctiva/sclera: Conjunctivae normal.      Pupils: Pupils are equal, round, and reactive to light.   Cardiovascular:      Rate and Rhythm: Regular rhythm.      Pulses: Normal pulses.      Heart sounds: Normal heart sounds.   Pulmonary:      Effort: Pulmonary effort is normal.      Breath sounds: Normal breath sounds.   Abdominal:      General: Abdomen is flat. Bowel sounds are normal.      Palpations: Abdomen is soft.   Musculoskeletal:         General: Normal range of motion.      Cervical back: Normal range of motion and neck supple.   Skin:     General: Skin is warm and dry.      Capillary Refill: Capillary refill takes less than 2 seconds.   Neurological:      General: No focal deficit present.      Mental Status: She is alert and oriented to person, place, and time. Mental status is at baseline.   Psychiatric:         Mood and Affect: Mood normal.  "        Behavior: Behavior normal.         Thought Content: Thought content normal.         Judgment: Judgment normal.         Procedures    ED Course:    ED Course as of 10/24/24 1643   Tue Oct 15, 2024   1101 I have reviewed the mid-level provider(s) note and verbally discussed the case/plan of care.  I was available for consultation as needed at all times during the patient's visit in the Emergency Department.  I agree with the clinical impression, plan, and disposition unless otherwise stated in the MDM below.    ATTENDING ATTESTATION  HPI: 34 year old female presents with right flank pain. Recently diagnosed with 8 mm kidney stone in [place].    MDM: ED workup reviewed.    I have reviewed the labs results.  CBC, CMP, lipase clinically unremarkable.  UA shows no hematuria or infection.    Radiology reports reviewed.     CT Abdomen Pelvis Without Contrast    Result Date: 10/15/2024  Mild right hydronephrosis secondary to a 7 mm right UPJ stone.     CTDI: 23.56 mGy DLP: 1195.95 mGy.cm   This study was performed with techniques to keep radiation doses as low as reasonably achievable (ALARA). Individualized dose reduction techniques using automated exposure control or adjustment of mA and/or kV according to the patient size were employed.     Images were reviewed, interpreted, and dictated by Dr. Ralph Thomas MD Transcribed by Courtney Metcalf PA-C.       [JS]   0561 Left message for Dr. Burton to call back for consult. [TW]      ED Course User Index  [JS] Uche Hu DO  [TW] Chet Bush APRN       Lab Results (last 24 hours)       ** No results found for the last 24 hours. **             No radiology results from the last 24 hrs       MDM      Initial impression of presenting illness: Patient is a 34-year-old female with complaint of right sided flank pain x 4 days.  Patient reports that she was previously seen for renal stone.  In urology clinic yesterday and plans were being made to schedule surgical  intervention for stone.  She denies fever.  Reports dysuria.  Denies changes in bowel habit.  Denies OTC medication or home remedy.  Denies alleviating or exacerbating factors.    DDX: includes but is not limited to: Urosepsis, ureter stone, acute cystitis or other    Patient arrives stable with vitals interpreted by myself.     Pertinent features from physical exam: Lung sounds clear bilaterally throughout.  Ab soft nontender.  Bowel sounds are normal.  Heart sounds normal.  CVA tenderness noted on right side    Initial diagnostic plan: CBC, CMP, lipase, urinalysis, CT abdomen pelvis    Results from initial plan were reviewed and interpreted by me revealing CBC is with mild elevation white count.  Mild anemia noted.  Urinalysis with trace leukocytes.  Squamous cells seen 7-12.  Trace bacteria.  CMP is within normal parameter.  CT abdomen pelvis with the following impression mild right hydronephrosis secondary to 7 mm right UPJ stone    Diagnostic information from other sources: Chart review    Interventions / Re-evaluation: Vital signs stable throughout encounter.  Patient received 4 mg Zofran, 4 mg of morphine, 30 mg of Toradol.  Reports pain was improved.    Results/clinical rationale were discussed with patient    Consultations/Discussion of results with other physicians: Dr. Burton recommends admission.  Spoke with Dr. Beckford she is agreeable to admit this patient.    Disposition plan: Admit patient to medicine.  -----        Final diagnoses:   Hydronephrosis with urinary obstruction due to renal calculus   Kidney stone          Chet Bush, APRN  10/24/24 7410

## 2024-11-22 DIAGNOSIS — J30.2 ACUTE SEASONAL ALLERGIC RHINITIS: Primary | ICD-10-CM

## 2024-11-22 RX ORDER — FLUTICASONE PROPIONATE 50 MCG
2 SPRAY, SUSPENSION (ML) NASAL DAILY
Qty: 16 G | Refills: 2 | Status: SHIPPED | OUTPATIENT
Start: 2024-11-22

## 2024-12-26 ENCOUNTER — HOSPITAL ENCOUNTER (OUTPATIENT)
Dept: ULTRASOUND IMAGING | Facility: HOSPITAL | Age: 34
Discharge: HOME | End: 2024-12-26
Admitting: UROLOGY
Payer: MEDICAID

## 2024-12-26 DIAGNOSIS — N20.0 KIDNEY STONE: ICD-10-CM

## 2024-12-26 PROCEDURE — 76775 US EXAM ABDO BACK WALL LIM: CPT

## 2024-12-27 ENCOUNTER — OFFICE VISIT (OUTPATIENT)
Dept: FAMILY MEDICINE CLINIC | Facility: CLINIC | Age: 34
End: 2024-12-27
Payer: MEDICAID

## 2024-12-27 VITALS
TEMPERATURE: 97.9 F | WEIGHT: 293 LBS | BODY MASS INDEX: 53.92 KG/M2 | HEART RATE: 86 BPM | OXYGEN SATURATION: 98 % | DIASTOLIC BLOOD PRESSURE: 82 MMHG | SYSTOLIC BLOOD PRESSURE: 130 MMHG | HEIGHT: 62 IN | RESPIRATION RATE: 14 BRPM

## 2024-12-27 DIAGNOSIS — J22 LOWER RESPIRATORY TRACT INFECTION: Primary | ICD-10-CM

## 2024-12-27 DIAGNOSIS — J01.40 ACUTE NON-RECURRENT PANSINUSITIS: ICD-10-CM

## 2024-12-27 PROCEDURE — 99214 OFFICE O/P EST MOD 30 MIN: CPT | Performed by: NURSE PRACTITIONER

## 2024-12-27 PROCEDURE — 1160F RVW MEDS BY RX/DR IN RCRD: CPT | Performed by: NURSE PRACTITIONER

## 2024-12-27 PROCEDURE — 1159F MED LIST DOCD IN RCRD: CPT | Performed by: NURSE PRACTITIONER

## 2024-12-27 PROCEDURE — 1126F AMNT PAIN NOTED NONE PRSNT: CPT | Performed by: NURSE PRACTITIONER

## 2024-12-27 PROCEDURE — 96372 THER/PROPH/DIAG INJ SC/IM: CPT | Performed by: NURSE PRACTITIONER

## 2024-12-27 RX ORDER — FLUCONAZOLE 150 MG/1
150 TABLET ORAL
Qty: 3 TABLET | Refills: 0 | Status: SHIPPED | OUTPATIENT
Start: 2024-12-27

## 2024-12-27 RX ORDER — BROMPHENIRAMINE MALEATE, PSEUDOEPHEDRINE HYDROCHLORIDE, AND DEXTROMETHORPHAN HYDROBROMIDE 2; 30; 10 MG/5ML; MG/5ML; MG/5ML
5 SYRUP ORAL 4 TIMES DAILY PRN
Qty: 120 ML | Refills: 0 | Status: SHIPPED | OUTPATIENT
Start: 2024-12-27

## 2024-12-27 RX ORDER — METHYLPREDNISOLONE SODIUM SUCCINATE 125 MG/2ML
80 INJECTION INTRAMUSCULAR; INTRAVENOUS EVERY 6 HOURS
Status: COMPLETED | OUTPATIENT
Start: 2024-12-27 | End: 2024-12-27

## 2024-12-27 RX ORDER — PREDNISONE 10 MG/1
TABLET ORAL
Qty: 15 TABLET | Refills: 0 | Status: SHIPPED | OUTPATIENT
Start: 2024-12-27

## 2024-12-27 RX ORDER — CEFDINIR 300 MG/1
300 CAPSULE ORAL 2 TIMES DAILY
Qty: 14 CAPSULE | Refills: 0 | Status: SHIPPED | OUTPATIENT
Start: 2024-12-27 | End: 2025-01-03

## 2024-12-27 RX ADMIN — METHYLPREDNISOLONE SODIUM SUCCINATE 80 MG: 125 INJECTION INTRAMUSCULAR; INTRAVENOUS at 17:07

## 2024-12-27 NOTE — PROGRESS NOTES
Established Patient        Chief Complaint:   Chief Complaint   Patient presents with    Cough    Nasal Congestion     Pt reports Flu A  urgent care. Treated with tamiflu. DX of Bronchitis at [place] urgent care. Treated with z pack and steroids.         History of Present Illness:    Carolyn Theodore is a 34 y.o. female who presents today     Monday 12/16--seen at , diagnosed with Bronchitis  Zpack, steroid injection in office    Fever onset 12/17-- on 12/20--diagnosed with Flu  Steroid injection in office, Prednisone 40mg daily x 5 days, tessalon perles    Persistent cough, productive, thick, dark green sputum and mucus production  SOA, chest tightness  Subjective     The following portions of the patient's history were reviewed and updated as appropriate: allergies, current medications, past family history, past medical history, past social history, past surgical history and problem list.    ALLERGIES  Allergies   Allergen Reactions    Doxycycline Nausea Only    Nsaids     Amoxicillin Rash    Penicillins Rash       ROS  Review of Systems   Constitutional:  Positive for fatigue. Negative for fever and unexpected weight change.   HENT:  Positive for congestion, postnasal drip, sinus pressure and sinus pain. Negative for ear pain (congestion, pressure) and sore throat.    Eyes:  Negative for visual disturbance.   Respiratory:  Positive for cough and shortness of breath. Negative for chest tightness.    Cardiovascular:  Negative for chest pain and palpitations.   Gastrointestinal:  Negative for constipation, diarrhea, nausea and vomiting.   Musculoskeletal:  Negative for myalgias.   Neurological:  Negative for dizziness and light-headedness.   Psychiatric/Behavioral:  Negative for self-injury and suicidal ideas.    All other systems reviewed and are negative.        Objective     Vital Signs:   /82 (BP Location: Left arm, Patient Position: Sitting, Cuff Size: Adult)   Pulse 86   Temp  "97.9 °F (36.6 °C) (Infrared)   Resp 14   Ht 157.5 cm (62.01\")   Wt (!) 142 kg (313 lb)   LMP 12/11/2024 (Approximate)   SpO2 98%   BMI 57.23 kg/m²     Class 3 Severe Obesity (BMI >=40). Obesity-related health conditions include the following: obstructive sleep apnea and GERD. Obesity is unchanged. BMI is is above average; BMI management plan is completed. We discussed Information on healthy weight added to patient's after visit summary.          Physical Exam   Physical Exam  Vitals and nursing note reviewed.   HENT:      Right Ear: A middle ear effusion is present. Tympanic membrane is not injected or bulging.      Left Ear: A middle ear effusion is present. Tympanic membrane is not injected or bulging.      Nose: Nose normal. Mucosal edema present. No congestion or rhinorrhea.      Right Turbinates: Swollen.      Left Turbinates: Swollen.      Right Sinus: Maxillary sinus tenderness and frontal sinus tenderness present.      Left Sinus: Maxillary sinus tenderness and frontal sinus tenderness present.      Mouth/Throat:      Lips: Pink.      Pharynx: Posterior oropharyngeal erythema and postnasal drip present.   Eyes:      General: Lids are normal.   Cardiovascular:      Rate and Rhythm: Normal rate and regular rhythm.      Heart sounds: Normal heart sounds.   Pulmonary:      Effort: Pulmonary effort is normal. No accessory muscle usage, prolonged expiration or respiratory distress.      Breath sounds: Wheezing and rhonchi present. No decreased breath sounds.   Lymphadenopathy:      Head:      Right side of head: Submandibular and tonsillar adenopathy present.      Left side of head: Submandibular and tonsillar adenopathy present.   Neurological:      Mental Status: She is alert and oriented to person, place, and time.      Gait: Gait is intact.   Psychiatric:         Attention and Perception: Attention normal.         Mood and Affect: Mood and affect normal.         Speech: Speech normal.         Behavior: " Behavior normal. Behavior is cooperative.         Assessment and Plan      Assessment/Plan:   Diagnoses and all orders for this visit:    1. Lower respiratory tract infection (Primary)  -     brompheniramine-pseudoephedrine-DM 30-2-10 MG/5ML syrup; Take 5 mL by mouth 4 (Four) Times a Day As Needed for Cough or Congestion.  Dispense: 120 mL; Refill: 0  -     cefdinir (OMNICEF) 300 MG capsule; Take 1 capsule by mouth 2 (Two) Times a Day for 7 days.  Dispense: 14 capsule; Refill: 0  -     fluconazole (Diflucan) 150 MG tablet; Take 1 tablet by mouth Every 3 (Three) Days.  Dispense: 3 tablet; Refill: 0  -     predniSONE (DELTASONE) 10 MG tablet; Take 5 tablets today, decrease dose by 1 tablet each day until gone. 5,4,3,2,1  Dispense: 15 tablet; Refill: 0    2. Acute non-recurrent pansinusitis  -     cefdinir (OMNICEF) 300 MG capsule; Take 1 capsule by mouth 2 (Two) Times a Day for 7 days.  Dispense: 14 capsule; Refill: 0  -     fluconazole (Diflucan) 150 MG tablet; Take 1 tablet by mouth Every 3 (Three) Days.  Dispense: 3 tablet; Refill: 0    Risks, benefits, and potential side effects of current/new medications reviewed with patient.  Patient voiced understanding and wished to proceed with treatment.    May alternate Acetaminophen and Ibuprofen every 4-6 hours as needed for pain, fever > 101.    Increased clear liquids.    Patient was encouraged to keep me informed of any acute changes, lack of improvement, or any new concerning symptoms.      Discussion Summary:  Discussed plan of care in detail with pt today; pt verb understanding and agrees.      I have reviewed and updated all copied forward information, as appropriate.  I attest to the accuracy and relevance of any unchanged information.      Follow up:  No follow-ups on file.     Patient Education:  There are no Patient Instructions on file for this visit.    Maira Abernathy, JEAN  12/27/24  15:30 EST          Please note that portions of this note may have been  completed with a voice recognition program.

## 2025-01-04 DIAGNOSIS — F41.9 ANXIETY: ICD-10-CM

## 2025-01-07 RX ORDER — DESVENLAFAXINE 25 MG/1
25 TABLET, EXTENDED RELEASE ORAL DAILY
Qty: 30 TABLET | Refills: 1 | Status: SHIPPED | OUTPATIENT
Start: 2025-01-07

## 2025-01-10 ENCOUNTER — TELEPHONE (OUTPATIENT)
Dept: UROLOGY | Facility: CLINIC | Age: 35
End: 2025-01-10

## 2025-01-10 NOTE — TELEPHONE ENCOUNTER
"  Caller: Carolyn Theodore \"Rachel\"    Relationship: Self    Best call back number: [phone] (home)      What is the best time to reach you: ANYTIME    What was the call regarding: PATIENT CALLED TO CANCEL SAME DAY APPT PRIOR TO 10:20 AM WITH NIRANJAN ALONSO.  CANCELLED APPT, PLEASE CALL PT TO RESCHEDULE.  THANK YOU.    Is it okay if the provider responds through [facility]: YES      "

## 2025-01-15 ENCOUNTER — OFFICE VISIT (OUTPATIENT)
Dept: UROLOGY | Facility: CLINIC | Age: 35
End: 2025-01-15
Payer: MEDICAID

## 2025-01-15 VITALS
SYSTOLIC BLOOD PRESSURE: 138 MMHG | HEART RATE: 86 BPM | WEIGHT: 293 LBS | BODY MASS INDEX: 53.92 KG/M2 | TEMPERATURE: 98.7 F | DIASTOLIC BLOOD PRESSURE: 78 MMHG | OXYGEN SATURATION: 99 % | HEIGHT: 62 IN

## 2025-01-15 DIAGNOSIS — N20.0 KIDNEY STONE: Primary | ICD-10-CM

## 2025-01-15 DIAGNOSIS — N39.3 STRESS INCONTINENCE: ICD-10-CM

## 2025-01-15 DIAGNOSIS — R39.14 FEELING OF INCOMPLETE BLADDER EMPTYING: ICD-10-CM

## 2025-01-15 NOTE — PROGRESS NOTES
Office Visit     Patient Name: Carolyn Theodore  : 1990   MRN: [id number]     Chief Complaint:   Chief Complaint   Patient presents with    Follow-up     SELAM results     Referring Provider: No ref. provider found    Primary Care Provider: Maira Abernathy APRN     History of Present Illness: Carolyn Theodore is a 34 y.o. female presents for 8 week follow for nephrolithiasis s/p right URS/LL with stent insertion on 10/16/24, stent removal on 10/24/24.  History of gastric sleeve putting patient at high risk.  Denies any fever, chills, hematuria, dysuria, flank pain, nausea, or vomiting.  Patient mentions struggling with BUD and wishes to address with follow up.      Prior stone history:   Right URS/LL with stent insertion on 10/16/24, stent removal on 10/24/24.    Possibly passed stone in   Basket retrieval in     Water intake: 32 ounces per day    Fluid intake: 60 ounces per day    Dietary changes: stopped Castillo energy drinks      Subjective   Review of System:   As noted in HPI.    Past Medical History:   Diagnosis Date    Abnormal liver enzymes     Asthma     Positive methacholine challenge test.  Frequent bronchitis.    Depression     Eosinophilia     Resolved in the past once off of Singulair.    Extreme obesity with alveolar hypoventilation 2016    GERD (gastroesophageal reflux disease)     w/o esophagitis    Hypertension     Morbid obesity     Obesity      Negative sleep study in the past, no nocturnal hypoxemia on the study    Peripheral edema 2016    PONV (postoperative nausea and vomiting)     Renal calculi     Sleep apnea      Past Surgical History:   Procedure Laterality Date     SECTION      CHOLECYSTECTOMY      GASTRIC SLEEVE LAPAROSCOPIC      KIDNEY STONE SURGERY      TONSILLECTOMY      URETEROSCOPY LASER LITHOTRIPSY WITH STENT INSERTION Right 10/16/2024    Procedure: URETEROSCOPY LASER LITHOTRIPSY WITH STENT INSERTION retrograde;  Surgeon: Todd Burton  MD [place];  Location: [facility] OR;  Service: Urology;  Laterality: Right;     Family History   Problem Relation Age of Onset    Hypertension Father     Diabetes Father     Alcohol abuse Other     Cancer Other     Hypertension Other     Hypertension Other      Social History     Socioeconomic History    Marital status:    Tobacco Use    Smoking status: Never     Passive exposure: Never    Smokeless tobacco: Never   Vaping Use    Vaping status: Every Day    Substances: Nicotine, Flavoring    Devices: Pre-filled or refillable cartridge   Substance and Sexual Activity    Alcohol use: No    Drug use: Never    Sexual activity: Yes     Partners: Male     Birth control/protection: None       Current Outpatient Medications:     albuterol (PROVENTIL) (2.5 MG/3ML) 0.083% nebulizer solution, Take 2.5 mg by nebulization Every 4 (Four) Hours As Needed for Wheezing., Disp: 180 mL, Rfl: 0    Albuterol-Budesonide 90-80 MCG/ACT aerosol, Inhale 2 Inhalations Every 2 (Two) Hours As Needed (cough, shortness of breath, wheezing)., Disp: 10.7 g, Rfl: 0    Biotin 5 MG capsule, Take 1 capsule by mouth Every Night., Disp: , Rfl:     Budeson-Glycopyrrol-Formoterol (BREZTRI) 160-9-4.8 MCG/ACT aerosol inhaler, Inhale 2 puffs 2 (Two) Times a Day., Disp: 1 each, Rfl: 0    Desvenlafaxine Succinate ER 25 MG tablet sustained-release 24 hour, TAKE 1 TABLET BY MOUTH DAILY, Disp: 30 tablet, Rfl: 1    esomeprazole (nexIUM) 20 MG capsule, Take 1 capsule by mouth Every Night., Disp: 30 capsule, Rfl: 2    fluticasone (FLONASE) 50 MCG/ACT nasal spray, Administer 2 sprays into the nostril(s) as directed by provider Daily., Disp: 16 g, Rfl: 2    loratadine (CLARITIN) 10 MG tablet, Take 1 tablet by mouth Daily., Disp: 30 tablet, Rfl: 2    vitamin D3 125 MCG (5000 UT) capsule capsule, Take 1 capsule by mouth Daily., Disp: , Rfl:     Allergies   Allergen Reactions    Doxycycline Nausea Only    Nsaids     Amoxicillin Rash    Penicillins Rash     Objective  "  Visit Vitals  /78   Pulse 86   Temp 98.7 °F (37.1 °C)   Ht 157.5 cm (62.01\")   Wt (!) 142 kg (313 lb)   LMP 12/11/2024 (Approximate)   SpO2 99%   BMI 57.23 kg/m²        Body mass index is 57.23 kg/m².     Physical Exam  Vitals and nursing note reviewed.   Constitutional:       General: She is awake. She is not in acute distress.     Appearance: She is morbidly obese.   Pulmonary:      Effort: Pulmonary effort is normal.   Neurological:      Mental Status: She is alert and oriented to person, place, and time. Mental status is at baseline.   Psychiatric:         Mood and Affect: Mood normal.         Behavior: Behavior is cooperative.          Labs  Lab Results   Component Value Date    COLORU Yellow 10/15/2024    CLARITYU Clear 10/15/2024    SPECGRAV 1.020 10/14/2024    PHUR 7.0 10/15/2024    LEUKOCYTESUR Trace (A) 10/15/2024    NITRITE Negative 10/14/2024    PROTEINPOCUA Negative 10/14/2024    GLUCOSEUR Negative 10/14/2024    KETONESU Negative 10/15/2024    UROBILINOGEN 0.2 E.U./dL 10/15/2024    BILIRUBINUR Negative 10/15/2024    RBCUR Small (A) 10/14/2024      Lab Results   Component Value Date    WBCUA 0-2 10/15/2024    WBCUA 6-10 (A) 10/12/2024    RBCUA 0-2 10/15/2024    RBCUA 0-2 10/12/2024    BACTERIA Trace (A) 10/15/2024    BACTERIA None Seen 10/12/2024    HYALCASTU None Seen 10/15/2024    HYALCASTU 0-2 10/12/2024    SQUAMEPIUA 7-12 (A) 10/15/2024    SQUAMEPIUA 13-20 (A) 10/12/2024        Lab Results   Component Value Date    WBC 10.77 10/16/2024    HGB 10.4 (L) 10/16/2024    HCT 34.5 10/16/2024    MCV 78.6 (L) 10/16/2024     10/16/2024     Lab Results   Component Value Date    GLUCOSE 107 (H) 10/16/2024    CALCIUM 8.5 (L) 10/16/2024     10/16/2024    K 3.9 10/16/2024    CO2 24.6 10/16/2024     10/16/2024    BUN 14 10/16/2024    BUN 11 10/15/2024    CREATININE 1.06 (H) 10/16/2024    CREATININE 0.99 10/15/2024    EGFR 70.0 10/16/2024    EGFR 75.9 10/15/2024    BCR 13.2 10/16/2024    " "ANIONGAP 11.4 10/16/2024    ALT 16 10/15/2024    AST 18 10/15/2024       Lab Results   Component Value Date    HGBA1C 5.9 (H) 11/13/2023        No results found for: \"PSA\", \"PCTFREEPSA\", \"PROSTATEBIO\"    No results found for: \"URICACIDSTN\", \"OJTU5UPUCQQ\", \"CXNG2GRLEI\", \"LABMAGN\"  Lab Results   Component Value Date    PHTL20NW 46.6 07/28/2021     Lab Results   Component Value Date    LABPH 6.0 02/28/2015       Radiographic Studies  US Renal Bilateral    Result Date: 12/26/2024  Unremarkable renal ultrasound.   This report was signed and finalized on 12/26/2024 4:03 PM by Ralph Thomas MD.      CT Abdomen Pelvis Without Contrast    Result Date: 10/15/2024  Mild right hydronephrosis secondary to a 7 mm right UPJ stone.     CTDI: 23.56 mGy DLP: 1195.95 mGy.cm   This study was performed with techniques to keep radiation doses as low as reasonably achievable (ALARA). Individualized dose reduction techniques using automated exposure control or adjustment of mA and/or kV according to the patient size were employed.     Images were reviewed, interpreted, and dictated by Dr. Ralph Thomas MD Transcribed by Courtney Metcalf PA-C.  This report was signed and finalized on 10/15/2024 12:02 PM by Ralph Thomas MD.      CT ABDOMEN PELVIS W CONTRAST    Result Date: 10/11/2024  1.    Mild right hydronephrosis proximal to an 8 mm calculus in the renal pelvis. Mild reactive edema. 2.     Additional tiny bilateral renal calculi. 3.    Chronic and incidental findings as above. Dictated By:    - 10/11/2024 12:51 AM EDT Electronically Signed By:  Ralph Abraham MD - 10/11/2024 12:55 AM EDT      I have reviewed the above labs and imaging.     PVR  Post-void residual performed with ultrasound by staff and interpreted by me - 26 mL      Assessment / Plan    Assessment:   Diagnoses and all orders for this visit:    1. Kidney stone (Primary)  -     XR Abdomen KUB; Future    2. Stress incontinence    3. Feeling of incomplete bladder emptying " [R39.14]         Carolyn Theodore is a 34 y.o. female presents for 8 week follow for nephrolithiasis s/p right URS/LL with stent insertion on 10/16/24, stent removal on 10/24/24.  History of gastric sleeve putting patient at high risk.  Denies any fever, chills, hematuria, dysuria, flank pain, nausea, or vomiting.       Prior stone history:   Right URS/LL with stent insertion on 10/16/24, stent removal on 10/24/24.    Possibly passed stone in 2016  Basket retrieval in     Water intake: 32 ounces per day, increase water to 64 ounces per day    Fluid intake: 60 ounces per day, recommend total fluid intake of 2.5 liters per day    Dietary changes: stopped Castillo energy drinks    Significant Litholink findings: obtained while pregnant by OB in 2019, offered repeat metabolic workup and wishes to make dietary changes and revisit at later time.     Significant serum labs: Not obtained.     Stone prevention: No prior prevention.     Stone analysis: N/A    Most recent imagin24 renal US shows resolution of hydronephrosis, recommend annual imaging, KUB      Fluid intake goal of 2.5 liters per day majority of fluids need to be water, eat less salt/sodium, and eat less animal protein.      Patient wishes to follow up in 3 weeks to discuss BUD, hold urine prior to pelvic.  PVR 26 mL.    Plan:  Fluid intake goal ~ 2.5L per day majority of fluids need to be water and dietary changes as discussed  Repeat imaging KUB in 1 year prior to appointment  Follow up in 3 weeks to discuss BUD, hold urine prior to pelvic exam, patient will need to complete female incontinence questionnaire.       Follow Up:   Return in about 3 weeks (around 2025) for recheck with Keyana, hold urine prior to exam.    Keyana Fry, MSN, APRN, FNP-C  [facility]

## 2025-01-30 ENCOUNTER — OFFICE VISIT (OUTPATIENT)
Dept: FAMILY MEDICINE CLINIC | Facility: CLINIC | Age: 35
End: 2025-01-30
Payer: MEDICAID

## 2025-01-30 VITALS
WEIGHT: 293 LBS | BODY MASS INDEX: 53.92 KG/M2 | OXYGEN SATURATION: 99 % | DIASTOLIC BLOOD PRESSURE: 98 MMHG | HEIGHT: 62 IN | RESPIRATION RATE: 16 BRPM | HEART RATE: 109 BPM | SYSTOLIC BLOOD PRESSURE: 160 MMHG

## 2025-01-30 DIAGNOSIS — I10 PRIMARY HYPERTENSION: ICD-10-CM

## 2025-01-30 DIAGNOSIS — R09.81 NASAL CONGESTION: ICD-10-CM

## 2025-01-30 DIAGNOSIS — J30.9 CHRONIC ALLERGIC RHINITIS: ICD-10-CM

## 2025-01-30 DIAGNOSIS — R05.1 ACUTE COUGH: ICD-10-CM

## 2025-01-30 DIAGNOSIS — H65.93 OME (OTITIS MEDIA WITH EFFUSION), BILATERAL: Primary | ICD-10-CM

## 2025-01-30 RX ORDER — METHYLPREDNISOLONE 4 MG/1
TABLET ORAL
Qty: 21 TABLET | Refills: 0 | Status: SHIPPED | OUTPATIENT
Start: 2025-01-30

## 2025-01-30 RX ORDER — TRIAMCINOLONE ACETONIDE 55 UG/1
2 SPRAY, METERED NASAL DAILY
Qty: 16.5 ML | Refills: 11 | Status: SHIPPED | OUTPATIENT
Start: 2025-01-30

## 2025-01-30 RX ORDER — LOSARTAN POTASSIUM 25 MG/1
25 TABLET ORAL DAILY
Qty: 30 TABLET | Refills: 5 | Status: SHIPPED | OUTPATIENT
Start: 2025-01-30

## 2025-01-30 RX ORDER — LEVOCETIRIZINE DIHYDROCHLORIDE 5 MG/1
5 TABLET, FILM COATED ORAL EVERY EVENING
Qty: 30 TABLET | Refills: 5 | Status: SHIPPED | OUTPATIENT
Start: 2025-01-30

## 2025-01-30 RX ORDER — CEFDINIR 300 MG/1
300 CAPSULE ORAL 2 TIMES DAILY
Qty: 14 CAPSULE | Refills: 0 | Status: SHIPPED | OUTPATIENT
Start: 2025-01-30 | End: 2025-02-06

## 2025-01-30 NOTE — PROGRESS NOTES
"                      Established Patient        Chief Complaint:   Chief Complaint   Patient presents with    Cough    Sore Throat    Earache     Pt is here for ear pain, cough and sore throat. Pt stated she has had this for a while and having a hard time getting over it.            History of Present Illness:    Carolyn Theodore is a 34 y.o. female who presents today for persistent cough, sore throat, ear pain. Intermittent for months since moving into parents house.     Ear pain onset 1 week ago, cough started 2 days later, steadily worsened    Continues Claritin, flonase daily    History of asthma. Breztri BID, albuterol PRN    BP elevated today. Denies chest pain, SOA, palpitations, severe HA, confusion, visual disturbance, dizziness.     Subjective     The following portions of the patient's history were reviewed and updated as appropriate: allergies, current medications, past family history, past medical history, past social history, past surgical history and problem list.    ALLERGIES  Allergies   Allergen Reactions    Doxycycline Nausea Only    Nsaids     Amoxicillin Rash    Penicillins Rash       Review of Systems  Gen- No fevers, chills  HEENT--+ runny nose, congestion, sore throat, ear pain  CV- No chest pain, palpitations  Resp- +cough, dyspnea  GI- No N/V/D, abd pain  -No dysuria, flank pain, difficulty urinating  Musc-No tenderness, swelling, decreased ROM  Neuro-No dizziness, headaches  Psych-No SI/HI, sleep disturbance    Objective     Vital Signs:   /98   Pulse 109   Resp 16   Ht 157.5 cm (62.01\")   Wt (!) 142 kg (313 lb)   SpO2 99%   BMI 57.23 kg/m²                Physical Exam   Physical Exam  Vitals and nursing note reviewed.   HENT:      Right Ear: A middle ear effusion is present.      Left Ear: A middle ear effusion is present.      Nose: Nose normal. Mucosal edema and rhinorrhea present. No congestion.      Right Turbinates: Swollen.      Left Turbinates: Swollen.      Right " Sinus: No maxillary sinus tenderness or frontal sinus tenderness.      Left Sinus: No maxillary sinus tenderness or frontal sinus tenderness.      Mouth/Throat:      Lips: Pink.      Pharynx: Posterior oropharyngeal erythema and postnasal drip present. No pharyngeal swelling or oropharyngeal exudate.   Eyes:      General: Lids are normal.      Conjunctiva/sclera: Conjunctivae normal.      Pupils: Pupils are equal, round, and reactive to light.   Cardiovascular:      Rate and Rhythm: Normal rate and regular rhythm.   Pulmonary:      Effort: Pulmonary effort is normal.      Breath sounds: Normal breath sounds.   Lymphadenopathy:      Head:      Right side of head: No tonsillar adenopathy.      Left side of head: No tonsillar adenopathy.   Neurological:      Mental Status: She is alert and oriented to person, place, and time.      Gait: Gait is intact.   Psychiatric:         Attention and Perception: Attention normal.         Mood and Affect: Mood and affect normal.         Speech: Speech normal.         Behavior: Behavior normal. Behavior is cooperative.         Assessment and Plan      Assessment/Plan:   Diagnoses and all orders for this visit:    1. OME (otitis media with effusion), bilateral (Primary)  -     methylPREDNISolone (MEDROL) 4 MG dose pack; Take as directed on package instructions.  Dispense: 21 tablet; Refill: 0  -     cefdinir (OMNICEF) 300 MG capsule; Take 1 capsule by mouth 2 (Two) Times a Day for 7 days.  Dispense: 14 capsule; Refill: 0  -     levocetirizine (XYZAL) 5 MG tablet; Take 1 tablet by mouth Every Evening.  Dispense: 30 tablet; Refill: 5    2. Nasal congestion  -     Triamcinolone Acetonide (Nasacort Allergy 24HR) 55 MCG/ACT nasal inhaler; Administer 2 sprays into the nostril(s) as directed by provider Daily.  Dispense: 16.5 mL; Refill: 11  -     levocetirizine (XYZAL) 5 MG tablet; Take 1 tablet by mouth Every Evening.  Dispense: 30 tablet; Refill: 5    3. Acute cough  -     levocetirizine  (XYZAL) 5 MG tablet; Take 1 tablet by mouth Every Evening.  Dispense: 30 tablet; Refill: 5    4. Chronic allergic rhinitis  -     Triamcinolone Acetonide (Nasacort Allergy 24HR) 55 MCG/ACT nasal inhaler; Administer 2 sprays into the nostril(s) as directed by provider Daily.  Dispense: 16.5 mL; Refill: 11  -     methylPREDNISolone (MEDROL) 4 MG dose pack; Take as directed on package instructions.  Dispense: 21 tablet; Refill: 0  -     levocetirizine (XYZAL) 5 MG tablet; Take 1 tablet by mouth Every Evening.  Dispense: 30 tablet; Refill: 5    5. Primary hypertension  -     losartan (Cozaar) 25 MG tablet; Take 1 tablet by mouth Daily.  Dispense: 30 tablet; Refill: 5    Risks, benefits, and potential side effects of current/new medications reviewed with patient.  Patient voiced understanding and wished to proceed with treatment.    Discussed need for reduction in sodium/salt/caffeine intake; improve sleep habits as able; inc formal CV exercise program with goal of vigorous activity most, if not all, days of the week.     Ambulatory blood pressure monitoring encouraged prior to taking medication daily and as needed.    Contact office for symptomatic HTN or consistently uncontrolled HTN.     Patient to increase dietary intake of vegetables, fruits, nuts, whole grains and fish. Decrease dietary intake of carbs, processed foods such as lunch meats, saturated fats, sugary beverages.     Increase exercise regimen as tolerated toward a goal of 120-150 minutes/week.     Patient to present to ED for chest pain, confusion, vision disturbance.     Patient was encouraged to keep me informed of any acute changes, lack of improvement, or any new concerning symptoms.      Discussion Summary:  Discussed plan of care in detail with pt today; pt verb understanding and agrees.        I have reviewed and updated all copied forward information, as appropriate.  I attest to the accuracy and relevance of any unchanged information.      Follow  up:  Return in about 1 month (around 2/28/2025) for Next scheduled follow up.     Patient Education:  There are no Patient Instructions on file for this visit.    JEAN Roth  02/16/25  17:38 EST          Please note that portions of this note may have been completed with a voice recognition program.

## 2025-02-20 DIAGNOSIS — K21.9 GASTROESOPHAGEAL REFLUX DISEASE WITHOUT ESOPHAGITIS: ICD-10-CM

## 2025-02-20 DIAGNOSIS — R05.1 ACUTE COUGH: ICD-10-CM

## 2025-02-20 DIAGNOSIS — I10 PRIMARY HYPERTENSION: ICD-10-CM

## 2025-02-20 DIAGNOSIS — R09.81 NASAL CONGESTION: ICD-10-CM

## 2025-02-20 DIAGNOSIS — F41.9 ANXIETY: ICD-10-CM

## 2025-02-20 DIAGNOSIS — J30.9 CHRONIC ALLERGIC RHINITIS: ICD-10-CM

## 2025-02-20 DIAGNOSIS — H65.93 OME (OTITIS MEDIA WITH EFFUSION), BILATERAL: ICD-10-CM

## 2025-02-20 RX ORDER — DESVENLAFAXINE 25 MG/1
25 TABLET, EXTENDED RELEASE ORAL DAILY
Qty: 90 TABLET | Refills: 0 | Status: SHIPPED | OUTPATIENT
Start: 2025-02-20

## 2025-02-20 RX ORDER — LOSARTAN POTASSIUM 25 MG/1
25 TABLET ORAL DAILY
Qty: 90 TABLET | Refills: 0 | Status: SHIPPED | OUTPATIENT
Start: 2025-02-20

## 2025-02-20 RX ORDER — LEVOCETIRIZINE DIHYDROCHLORIDE 5 MG/1
5 TABLET, FILM COATED ORAL EVERY EVENING
Qty: 90 TABLET | Refills: 0 | Status: SHIPPED | OUTPATIENT
Start: 2025-02-20

## 2025-02-20 RX ORDER — TRIAMCINOLONE ACETONIDE 55 UG/1
2 SPRAY, METERED NASAL DAILY
Qty: 16.5 ML | Refills: 11 | Status: SHIPPED | OUTPATIENT
Start: 2025-02-20

## 2025-02-27 ENCOUNTER — OFFICE VISIT (OUTPATIENT)
Dept: FAMILY MEDICINE CLINIC | Facility: CLINIC | Age: 35
End: 2025-02-27
Payer: MEDICAID

## 2025-02-27 VITALS
HEIGHT: 62 IN | DIASTOLIC BLOOD PRESSURE: 90 MMHG | SYSTOLIC BLOOD PRESSURE: 140 MMHG | RESPIRATION RATE: 16 BRPM | WEIGHT: 293 LBS | BODY MASS INDEX: 53.92 KG/M2 | OXYGEN SATURATION: 99 % | HEART RATE: 77 BPM

## 2025-02-27 DIAGNOSIS — F41.9 ANXIETY: Primary | ICD-10-CM

## 2025-02-27 DIAGNOSIS — I10 HYPERTENSION, UNSPECIFIED TYPE: ICD-10-CM

## 2025-02-27 PROCEDURE — 1159F MED LIST DOCD IN RCRD: CPT | Performed by: NURSE PRACTITIONER

## 2025-02-27 PROCEDURE — 1126F AMNT PAIN NOTED NONE PRSNT: CPT | Performed by: NURSE PRACTITIONER

## 2025-02-27 PROCEDURE — 99214 OFFICE O/P EST MOD 30 MIN: CPT | Performed by: NURSE PRACTITIONER

## 2025-02-27 PROCEDURE — 1160F RVW MEDS BY RX/DR IN RCRD: CPT | Performed by: NURSE PRACTITIONER

## 2025-02-27 RX ORDER — DESVENLAFAXINE 50 MG/1
50 TABLET, FILM COATED, EXTENDED RELEASE ORAL DAILY
Qty: 90 TABLET | Refills: 3 | Status: SHIPPED | OUTPATIENT
Start: 2025-02-27

## 2025-02-27 NOTE — PROGRESS NOTES
"                      Established Patient        Chief Complaint:   Chief Complaint   Patient presents with    Anxiety     Pt is here for follow up .            History of Present Illness:    Carolyn Theodore is a 34 y.o. female who presents today for follow up of anxiety, HTN.    Anxiety  Pristiq 25mg daily  Tolerating well but feels like she may need to increase dose  Denies SI/HI    HTN  Losartan 25mg daily  Tolerating well  Denies chest pain, SOA, palpitations, severe HA, confusion, visual disturbance, dizziness.     Will be moving to [place] this weekend.     Subjective     The following portions of the patient's history were reviewed and updated as appropriate: allergies, current medications, past family history, past medical history, past social history, past surgical history and problem list.    ALLERGIES  Allergies   Allergen Reactions    Doxycycline Nausea Only    Nsaids     Amoxicillin Rash    Penicillins Rash       Review of Systems  Gen- No fevers, chills  HEENT--No runny nose, congestion, sore throat, ear pain  CV- No chest pain, palpitations  Resp- No cough, dyspnea  GI- No N/V/D, abd pain  -No dysuria, flank pain, difficulty urinating  Musc-No tenderness, swelling, decreased ROM  Neuro-No dizziness, headaches  Psych-No SI/HI, sleep disturbance, +anxiety    Objective     Vital Signs:   /90   Pulse 77   Resp 16   Ht 157.5 cm (62.01\")   Wt (!) 145 kg (320 lb)   SpO2 99%   BMI 58.51 kg/m²                Physical Exam   Physical Exam  Vitals and nursing note reviewed.   HENT:      Mouth/Throat:      Lips: Pink.   Eyes:      General: Lids are normal.   Cardiovascular:      Rate and Rhythm: Normal rate and regular rhythm.      Heart sounds: Normal heart sounds.   Pulmonary:      Effort: Pulmonary effort is normal.      Breath sounds: Normal breath sounds.   Neurological:      Mental Status: She is alert and oriented to person, place, and time.      Gait: Gait is intact.   Psychiatric:    "      Attention and Perception: Attention normal.         Mood and Affect: Mood and affect normal.         Speech: Speech normal.         Behavior: Behavior normal. Behavior is cooperative.         Assessment and Plan      Assessment/Plan:   Diagnoses and all orders for this visit:    1. Anxiety (Primary)  -     desvenlafaxine (Pristiq) 50 MG 24 hr tablet; Take 1 tablet by mouth Daily.  Dispense: 90 tablet; Refill: 3    2. Hypertension, unspecified type    Discussed with patient that elevated blood pressure likely due to increased anxiety regarding moving.     Options to treat or monitor BP discussed with patient. Due to patient moving out of state this weekend and inability to follow up in office, patient requests to continue monitoring BP at home and states that she will establish care with PCP in [place] within the month.    Discussed need for reduction in sodium/salt/caffeine intake; improve sleep habits as able; inc formal CV exercise program with goal of vigorous activity most, if not all, days of the week.     Ambulatory blood pressure monitoring encouraged.    Contact office for symptomatic HTN or consistently uncontrolled HTN.     Patient to increase dietary intake of vegetables, fruits, nuts, whole grains and fish. Decrease dietary intake of carbs, processed foods such as lunch meats, saturated fats, sugary beverages.     Increase exercise regimen as tolerated toward a goal of 120-150 minutes/week.     Patient to present to ED for chest pain, confusion, vision disturbance.     Discussed need for stress/anxiety reducing techniques such as prayer/meditation/breathing and counting exercises and avoidance of stress producing environments/situations; will follow clinically.    Discussion Summary:  Discussed plan of care in detail with pt today; pt verb understanding and agrees.    I confirm accuracy of unchanged data/findings which have been carried forward from previous visit, as well as I have updated  appropriately those that have changed.        I have reviewed and updated all copied forward information, as appropriate.  I attest to the accuracy and relevance of any unchanged information.      Follow up:  No follow-ups on file.     Patient Education:  There are no Patient Instructions on file for this visit.    JEAN Roth  03/17/25  00:02 EDT          Please note that portions of this note may have been completed with a voice recognition program.

## (undated) DEVICE — SOL IRR NACL 0.9PCT 3000ML

## (undated) DEVICE — RICH CYSTO: Brand: MEDLINE INDUSTRIES, INC.

## (undated) DEVICE — ADAPT/Y SCPE GATEWAY ADVNTGE

## (undated) DEVICE — NITINOL WIRE WITH HYDROPHILIC TIP: Brand: SENSOR

## (undated) DEVICE — GLV SURG SENSICARE LT W/ALOE PF LF 7 STRL

## (undated) DEVICE — FIBR LASR MOSES 200 DFL 2J 80HZ

## (undated) DEVICE — URETERAL ACCESS SHEATH SET: Brand: NAVIGATOR HD

## (undated) DEVICE — SYR LUERLOK 30CC

## (undated) DEVICE — SLV SCD CALF HEMOFORCE DVT THERP REPROC MD

## (undated) DEVICE — DUAL LUMEN URETERAL CATHETER

## (undated) DEVICE — GW AMPLTZ SUPRSTF PTFE JB .035 7X145CM

## (undated) DEVICE — ST URO THERMEDX/FLUIDSMART IRR/FLD/WARM PNT/PRESS/300MMHG

## (undated) DEVICE — CVR FTSWITCH UNIV